# Patient Record
Sex: FEMALE | Race: OTHER | Employment: FULL TIME | URBAN - METROPOLITAN AREA
[De-identification: names, ages, dates, MRNs, and addresses within clinical notes are randomized per-mention and may not be internally consistent; named-entity substitution may affect disease eponyms.]

---

## 2017-03-17 ENCOUNTER — PATIENT MESSAGE (OUTPATIENT)
Dept: INTERNAL MEDICINE CLINIC | Facility: CLINIC | Age: 37
End: 2017-03-17

## 2017-03-18 ENCOUNTER — OFFICE VISIT (OUTPATIENT)
Dept: FAMILY MEDICINE CLINIC | Facility: CLINIC | Age: 37
End: 2017-03-18

## 2017-03-18 ENCOUNTER — TELEPHONE (OUTPATIENT)
Dept: FAMILY MEDICINE CLINIC | Facility: CLINIC | Age: 37
End: 2017-03-18

## 2017-03-18 VITALS
BODY MASS INDEX: 32.79 KG/M2 | TEMPERATURE: 98 F | HEART RATE: 78 BPM | RESPIRATION RATE: 14 BRPM | SYSTOLIC BLOOD PRESSURE: 100 MMHG | HEIGHT: 60 IN | WEIGHT: 167 LBS | DIASTOLIC BLOOD PRESSURE: 70 MMHG

## 2017-03-18 DIAGNOSIS — J01.00 ACUTE MAXILLARY SINUSITIS, RECURRENCE NOT SPECIFIED: Primary | ICD-10-CM

## 2017-03-18 PROCEDURE — 99213 OFFICE O/P EST LOW 20 MIN: CPT | Performed by: NURSE PRACTITIONER

## 2017-03-18 RX ORDER — AMOXICILLIN AND CLAVULANATE POTASSIUM 875; 125 MG/1; MG/1
1 TABLET, FILM COATED ORAL 2 TIMES DAILY
Qty: 14 TABLET | Refills: 0 | Status: SHIPPED | OUTPATIENT
Start: 2017-03-18 | End: 2017-03-25

## 2017-03-18 RX ORDER — PREDNISONE 20 MG/1
20 TABLET ORAL 2 TIMES DAILY
Qty: 6 TABLET | Refills: 0 | Status: SHIPPED | OUTPATIENT
Start: 2017-03-18 | End: 2017-03-21

## 2017-03-18 NOTE — PROGRESS NOTES
CHIEF COMPLAINT:   Patient presents with:  Sinusitis      HPI:   Sony Peres is a 31year old female who presents with URI symptoms for 5 days. Onset was gradual  Symptoms are progressing. Location is reported as mid face.   Description is reporte NOSE: Patient reports nasal congestion and pressure. THROAT: Patient denies sore throat and difficulty swallowing. LUNGS: Patient denies shortness of breath or wheezing. Patient reports mild cough.  Description by patient is consistent with post nasal dri Gary Morris is a 31year old female who presents with Sinusitis.  Symptoms are consistent with: Acute maxillary sinusitis, recurrence not specified  (primary encounter diagnosis)      ASSESSMENT:  Acute maxillary sinusitis, recurrence not specified · Drink plenty of water, hot tea, and other liquids. This may help thin mucus. It also may promote sinus drainage. · Heat may help soothe painful areas of the face. Use a towel soaked in hot water.  Or,  the shower and direct the hot spray onto you · Unusual drowsiness or confusion  · Swelling of the forehead or eyelids  · Vision problems, including blurred or double vision  · Fever of 100.4ºF (38ºC) or higher, or as directed by your healthcare provider  · Seizure  · Breathing problems  · Symptoms no Patient verbalized understanding of diagnosis, treatments, and instructions. All questions answered. No impediment to understanding.

## 2017-03-18 NOTE — TELEPHONE ENCOUNTER
Actions Requested: Pt sent to urgent care  Situation/Background   Problem: possible sinus infection   Onset: 5 days   Associated Symptoms: pressure around sinuses,headache,blowing out clear drainage and dry cough . No fever.  Taking Advil cold and sinus wit

## 2017-03-21 NOTE — TELEPHONE ENCOUNTER
From: Raya Mirza  To: Harsha Eden MD  Sent: 3/17/2017 5:16 PM CDT  Subject: Other    I left a message before 7am today to try and be seen for my illness and still have not heard back.      Raya Mirza  [phone]

## 2017-03-21 NOTE — TELEPHONE ENCOUNTER
See 3/18/17 acute telephone encounter. Patient was seen and treated at [facility] on 3/18/17.

## 2017-03-21 NOTE — TELEPHONE ENCOUNTER
See 3/18/17 acute telephone encounter. Patient was seen and treated at [facility] on 3/18/17.

## 2017-03-21 NOTE — TELEPHONE ENCOUNTER
From: Ann Marie Moulton  To: Juana Austin MD  Sent: 3/17/2017 6:16 AM CDT  Subject: Non-Urgent Medical Question    Good morning,  I'm not feeling well and would like to know if I can be seen this afternoon or weekend?  I started with just congestion

## 2017-04-18 ENCOUNTER — OFFICE VISIT (OUTPATIENT)
Dept: OBGYN CLINIC | Facility: CLINIC | Age: 37
End: 2017-04-18

## 2017-04-18 VITALS
SYSTOLIC BLOOD PRESSURE: 126 MMHG | BODY MASS INDEX: 33 KG/M2 | WEIGHT: 167.81 LBS | DIASTOLIC BLOOD PRESSURE: 88 MMHG | HEART RATE: 92 BPM

## 2017-04-18 DIAGNOSIS — N89.8 VAGINAL DISCHARGE: Primary | ICD-10-CM

## 2017-04-18 PROCEDURE — 99213 OFFICE O/P EST LOW 20 MIN: CPT | Performed by: ADVANCED PRACTICE MIDWIFE

## 2017-04-18 RX ORDER — IBUPROFEN 200 MG
200 TABLET ORAL EVERY 6 HOURS PRN
COMMUNITY
End: 2017-05-09 | Stop reason: ALTCHOICE

## 2017-04-18 NOTE — PROGRESS NOTES
S.  Has an irritation in vulvar area with odorless discharge and a lot of itching for 7 days. Recently was treated with an antibiotic.   Has not treated sx in any way  O.  Vulva - normal female w/o lesion, erythema or edema  Vagina - watery white d/c w/o o

## 2017-04-21 DIAGNOSIS — B96.89 BV (BACTERIAL VAGINOSIS): Primary | ICD-10-CM

## 2017-04-21 DIAGNOSIS — N76.0 BV (BACTERIAL VAGINOSIS): Primary | ICD-10-CM

## 2017-04-21 RX ORDER — METRONIDAZOLE 500 MG/1
500 TABLET ORAL 2 TIMES DAILY
Qty: 14 TABLET | Refills: 0 | Status: SHIPPED | OUTPATIENT
Start: 2017-04-21 | End: 2017-05-09 | Stop reason: ALTCHOICE

## 2017-05-09 ENCOUNTER — OFFICE VISIT (OUTPATIENT)
Dept: FAMILY MEDICINE CLINIC | Facility: CLINIC | Age: 37
End: 2017-05-09

## 2017-05-09 VITALS
TEMPERATURE: 98 F | WEIGHT: 170 LBS | HEART RATE: 80 BPM | SYSTOLIC BLOOD PRESSURE: 128 MMHG | RESPIRATION RATE: 18 BRPM | OXYGEN SATURATION: 98 % | BODY MASS INDEX: 33 KG/M2 | DIASTOLIC BLOOD PRESSURE: 78 MMHG

## 2017-05-09 DIAGNOSIS — B37.0 ORAL CANDIDIASIS: Primary | ICD-10-CM

## 2017-05-09 PROCEDURE — 99213 OFFICE O/P EST LOW 20 MIN: CPT | Performed by: NURSE PRACTITIONER

## 2017-05-10 NOTE — PROGRESS NOTES
CHIEF COMPLAINT:     Patient presents with:   Thrush: think she may have oral thrush      HPI:   Florin Dela Cruz is a 31year old female who presents with complaints of 'bumps on back of tongue\", was on abx for sinus infection recently and also had vagi with 5 ml 4 times per day for 14 days  The patient indicates understanding of these issues and agrees to the plan. The patient has appointment with PCP in 10 days, would like to trry this treatment until she is seen. Crescencio Brand

## 2017-05-10 NOTE — PATIENT INSTRUCTIONS
Candida Infection: Yaquelin Roldan is a type of fungal infection in the mouth and throat. Sourav Martínez does not usually affect healthy adults. It is more common in people with a weakened immune system. It is also more likely if you take antibiotics.  Sourav Martínez is n Your health care provider will ask about your medical history and your symptoms. He or she will look closely at your mouth and throat. White or red patches will be scraped with a tongue depressor.  The sample will be sent to a lab to test. This test can usu You may be able to help prevent some cases of thrush. Make sure to:  · Practice good oral hygiene. Try using a chlorhexidine mouthwash. · Clean your dentures regularly as instructed. Make sure they fit you correctly.   · After using a corticosteroid inhale

## 2018-03-02 ENCOUNTER — PATIENT MESSAGE (OUTPATIENT)
Dept: INTERNAL MEDICINE CLINIC | Facility: CLINIC | Age: 38
End: 2018-03-02

## 2018-03-02 NOTE — TELEPHONE ENCOUNTER
Pt requesting to  lab titers from 2014 from [place] office. Please have clinic staff print and place in envelope.   Call pt when ready for P/U

## 2018-03-02 NOTE — TELEPHONE ENCOUNTER
From: Noel Mason  To: Barbara Mejia MD  Sent: 3/2/2018 11:11 AM CST  Subject: Test Results Question    Last week I requested a copy of the titers I had done in 2014 and still have not heard back from the office.  I am in the process of enrolli

## 2018-03-07 NOTE — TELEPHONE ENCOUNTER
Please call patient to  lab titers--message sent to patient via [employer] that [facility] staff will call her.

## 2018-03-07 NOTE — TELEPHONE ENCOUNTER
Approved   - To  Copy and  Mail  It to pt  Labs  For 2014   Or  She can  pick it  Up   Labs  From   2014 -  That  Was  4  Years  Ago   -  I Do not  Believe that is good enough  For school  Since  was  Almost  4 years ago , but its ok to provide with   Lab

## 2018-03-08 NOTE — TELEPHONE ENCOUNTER
Called Pt, states she has not received a call from [place] office yet. I informed her, will send request to print them. Offered to mail titers. Pt states she would rather pick them up.      [place] clinical staff, please print titers for Pt and call when re

## 2018-03-09 ENCOUNTER — OFFICE VISIT (OUTPATIENT)
Dept: FAMILY MEDICINE CLINIC | Facility: CLINIC | Age: 38
End: 2018-03-09

## 2018-03-09 DIAGNOSIS — Z02.9 ENCOUNTERS FOR ADMINISTRATIVE PURPOSES: Primary | ICD-10-CM

## 2018-03-09 NOTE — PROGRESS NOTES
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  Patient also with titers from 4 years ago and would like her titer information filled out clearing her.     Upon further examination of school form, practitioner clearing patient to carry 30 lbs, c

## 2018-03-15 ENCOUNTER — APPOINTMENT (OUTPATIENT)
Dept: LAB | Facility: HOSPITAL | Age: 38
End: 2018-03-15
Attending: INTERNAL MEDICINE
Payer: COMMERCIAL

## 2018-03-15 ENCOUNTER — PATIENT MESSAGE (OUTPATIENT)
Dept: INTERNAL MEDICINE CLINIC | Facility: CLINIC | Age: 38
End: 2018-03-15

## 2018-03-15 ENCOUNTER — OFFICE VISIT (OUTPATIENT)
Dept: INTERNAL MEDICINE CLINIC | Facility: CLINIC | Age: 38
End: 2018-03-15

## 2018-03-15 VITALS
SYSTOLIC BLOOD PRESSURE: 105 MMHG | HEART RATE: 87 BPM | DIASTOLIC BLOOD PRESSURE: 69 MMHG | HEIGHT: 60 IN | BODY MASS INDEX: 32.98 KG/M2 | WEIGHT: 168 LBS | TEMPERATURE: 98 F

## 2018-03-15 DIAGNOSIS — Z00.00 ANNUAL PHYSICAL EXAM: Primary | ICD-10-CM

## 2018-03-15 DIAGNOSIS — Z00.8 ENCOUNTER FOR BIOMETRIC SCREENING: ICD-10-CM

## 2018-03-15 DIAGNOSIS — Z00.00 ANNUAL PHYSICAL EXAM: ICD-10-CM

## 2018-03-15 LAB — RUBV IGG SER-ACNC: 13.6 IU/ML

## 2018-03-15 PROCEDURE — 86480 TB TEST CELL IMMUN MEASURE: CPT

## 2018-03-15 PROCEDURE — 99395 PREV VISIT EST AGE 18-39: CPT | Performed by: INTERNAL MEDICINE

## 2018-03-15 PROCEDURE — 86787 VARICELLA-ZOSTER ANTIBODY: CPT

## 2018-03-15 PROCEDURE — 86706 HEP B SURFACE ANTIBODY: CPT

## 2018-03-15 PROCEDURE — 36415 COLL VENOUS BLD VENIPUNCTURE: CPT

## 2018-03-15 PROCEDURE — 86765 RUBEOLA ANTIBODY: CPT

## 2018-03-15 PROCEDURE — 86735 MUMPS ANTIBODY: CPT

## 2018-03-15 PROCEDURE — 86762 RUBELLA ANTIBODY: CPT

## 2018-03-15 NOTE — PROGRESS NOTES
Zayda Morel is a 30year old female. 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   She needs th tremors. ALLERGY/ASTHMA: No seasonal allergy or asthma. HEME: no anemia, no abnormal bleeding or easy bruising. PSYCH: Denies anxiety or feeling depressed.         EXAM:   /69 (BP Location: Left arm, Patient Position: Sitting, Cuff Size: adult)   P Future  -     VARICELLA ZOSTER, IGG; Future  -     QUANTIFERON TB; Future                The patient indicates understanding of these issues and agrees to the plan.               Samir Cavanaugh MD  3/15/2018  3:20 PM

## 2018-03-15 NOTE — TELEPHONE ENCOUNTER
From: Agatha Leung  To: Princess Seda MD  Sent: 3/15/2018 4:02 PM CDT  Subject: Other    [facility]    I received an email before I checked out from your office a few minutes ago from the nursing [facility].  They will not accept the titers I showe

## 2018-03-16 ENCOUNTER — OFFICE VISIT (OUTPATIENT)
Dept: NEUROLOGY | Facility: CLINIC | Age: 38
End: 2018-03-16

## 2018-03-16 VITALS
SYSTOLIC BLOOD PRESSURE: 88 MMHG | BODY MASS INDEX: 32.98 KG/M2 | HEART RATE: 71 BPM | RESPIRATION RATE: 16 BRPM | DIASTOLIC BLOOD PRESSURE: 70 MMHG | WEIGHT: 168 LBS | HEIGHT: 60 IN

## 2018-03-16 DIAGNOSIS — G43.009 MIGRAINE WITHOUT AURA AND WITHOUT STATUS MIGRAINOSUS, NOT INTRACTABLE: Primary | ICD-10-CM

## 2018-03-16 DIAGNOSIS — IMO0002 CHRONIC MIGRAINE: ICD-10-CM

## 2018-03-16 LAB
HBV SURFACE AB SER-ACNC: 22.51 MIU/ML (ref ?–10)
HBV SURFACE AG SERPL QL IA: REACTIVE
MEV IGG SER-ACNC: 117 AU/ML (ref 30–?)
MUV IGG SER IA-ACNC: 64.6 AU/ML (ref 11–?)
VZV IGG SER IA-ACNC: 335.4 (ref 165–?)

## 2018-03-16 PROCEDURE — 99204 OFFICE O/P NEW MOD 45 MIN: CPT | Performed by: OTHER

## 2018-03-16 RX ORDER — ONDANSETRON 4 MG/1
4 TABLET, ORALLY DISINTEGRATING ORAL EVERY 8 HOURS PRN
Qty: 30 TABLET | Refills: 1 | Status: SHIPPED | OUTPATIENT
Start: 2018-03-16 | End: 2020-07-10

## 2018-03-16 RX ORDER — TOPIRAMATE 50 MG/1
TABLET, FILM COATED ORAL
Qty: 60 TABLET | Refills: 1 | Status: SHIPPED | OUTPATIENT
Start: 2018-03-16 | End: 2018-11-28 | Stop reason: ALTCHOICE

## 2018-03-16 RX ORDER — RIZATRIPTAN BENZOATE 10 MG/1
10 TABLET, ORALLY DISINTEGRATING ORAL AS NEEDED
Qty: 12 TABLET | Refills: 2 | Status: SHIPPED | OUTPATIENT
Start: 2018-03-16 | End: 2018-03-28

## 2018-03-16 NOTE — PATIENT INSTRUCTIONS
Migraine headache  Introduction  Migraines are extremely painful, recurring headaches that are sometimes accompanied by other symptoms, such as visual disturbances, for example, seeing an aura or nausea.  There are 2 types of migraine:  Migraine with aura, vessels narrow or constrict, reducing blood flow and leading to visual disturbances, difficulty speaking, weakness, numbness, or tingling sensation in one area of the body, or other similar symptoms.  Later, the blood vessels dilate or enlarge, leading to i whether you have a migraine or another kind of headache, such as a tension or sinus headache.  Your doctor will ask questions about when your headaches occur, how long they last, how often they come on, the location of the pain, and any symptoms that accomp and time it started. Note what you ate for the preceding 24 hours, how long you slept the night before, what you were doing just before the headache, any unusual stress in your life, how long the headache lasted, and what you did to make it stop.   Other li drugs help prevent migraines, although researchers are not sure why:   Divalproex sodium (Depakote)   Gabapentin (Neurontin)   Topiramate (Topamax)   Botox.  Botox, a medication made from a purified form of botulinum toxin, has been approved to treat migrai Cheese   Monosodium glutamate (MSG), a flavor enhancer found often in food from [employer] containing the amino acid tyramine, found in red wine, aged cheese, smoked fish, chicken livers, figs, and some beans   Nuts   Peanut butter   Madhu people with low levels of magnesium. In one study, people who took magnesium reduce the frequency of attacks by 41.6%, compared to 15.8% in those who took placebo.  Some studies also suggest that magnesium may help women whose migraines are triggered by the months. More research is needed to see whether butterbur is effective at preventing migraines. The studies used a standardized extract that lowered the amount of substances in the herb that might potentially harm the liver.  If you want to try butterbur for sinensis). Ask your doctor before taking Roise Breach, as it may interact with some medications or cause problems for people with some cancers. Alexia (Zingiber officinale)   Ginkgo biloba   [place] bark(Salix spp.).  People who are sensitive to aspirin shoul are believed to correspond to areas throughout the body. Preliminary studies suggest it may relieve pain and allow people with migraines to take less pain medication. More research is needed.  Practitioners believe reflexology helps you become more aware of the head and may be relieved following urination; this remedy is most appropriate for individuals who feel extremely weak and have difficulty keeping their eyes open. Ignatia.  For pain that may be described as a feeling of something being driven into the pattern (such as every seven days), and are accompanied by nausea and vomiting; pain is aggravated by motion, light or sun exposure, odors, and noise; this remedy is appropriate for children who may have a craving for spicy or acidic foods, despite having over-the-counter or prescription, or any complementary therapy before or during your pregnancy. Some doctors may recommend treating mild-to-moderate attacks during pregnancy with acetaminophen (Tylenol).   Warnings and Precautions  Use medications only as d

## 2018-03-19 LAB
M TB IFN-G CD4+ BCKGRND COR BLD-ACNC: -0.01 IU/ML
M TB IFN-G CD4+ T-CELLS BLD-ACNC: 0.05 IU/ML
M TB TUBERC IFN-G BLD QL: NEGATIVE
M TB TUBERC IGNF/MITOGEN IGNF CONTROL: >10 IU/ML

## 2018-03-20 ENCOUNTER — PATIENT MESSAGE (OUTPATIENT)
Dept: NEUROLOGY | Facility: CLINIC | Age: 38
End: 2018-03-20

## 2018-03-20 ENCOUNTER — TELEPHONE (OUTPATIENT)
Dept: INTERNAL MEDICINE CLINIC | Facility: CLINIC | Age: 38
End: 2018-03-20

## 2018-03-20 DIAGNOSIS — Z02.1 PRE-EMPLOYMENT EXAMINATION: Primary | ICD-10-CM

## 2018-03-20 NOTE — TELEPHONE ENCOUNTER
Results of the titers discussed with the patient. Her rubella titers are low. Please give an MMR vaccine. She will make a nurse visit. Please give a copy of her blood work. Repeat titer in 1 month.   I have the copy of her original documents

## 2018-03-20 NOTE — TELEPHONE ENCOUNTER
From: Gary Morris  To:  Ayla Chavarria MD  Sent: 3/20/2018 9:29 AM CDT  Subject: Liberty Gregg,  I have been taking the Topamax since Friday night and although I've noticed a huge improvement in the number of migraines an

## 2018-03-22 ENCOUNTER — PATIENT MESSAGE (OUTPATIENT)
Dept: NEUROLOGY | Facility: CLINIC | Age: 38
End: 2018-03-22

## 2018-03-22 NOTE — PROGRESS NOTES
Neurology Outpatient Initial Note    Nagi Pitt : 1980   HPI:     Nagi Pitt is a 30year old female who is being seen in neurologic evaluation. Patient is being seen in eval for migraines.     She has had migraines since childho cerebrovascular accident   • Other [OTHER] Father      rosacea    • Diabetes Mother    • Other [OTHER] Son 6     testicle surgery on left side       Social History:  Social History    Marital status:              Spouse name: drift  Sensory: intact to light touch and pinprick throughout  Reflexes: DTRs 2+ in bilateral biceps, brachioradialis, patella  Coordination / gait: no finger-nose-finger dysmetria, normal gait    IMAGING / STUDIES:  None available  LABS: reviewed     ASSE

## 2018-03-22 NOTE — TELEPHONE ENCOUNTER
From: Messi Jackson  To: Jesse Wang MD  Sent: 3/22/2018 12:39 PM CDT  Subject: Prescription Question    Hello Dr. Alexsander Lyons,    Call me when you have some time so we can discuss the second option you've provided in the previous message.  I was unable

## 2018-03-24 ENCOUNTER — LAB ENCOUNTER (OUTPATIENT)
Dept: LAB | Facility: HOSPITAL | Age: 38
End: 2018-03-24
Attending: INTERNAL MEDICINE
Payer: COMMERCIAL

## 2018-03-24 DIAGNOSIS — Z00.00 ANNUAL PHYSICAL EXAM: ICD-10-CM

## 2018-03-24 DIAGNOSIS — Z02.1 PRE-EMPLOYMENT EXAMINATION: ICD-10-CM

## 2018-03-24 LAB
ALBUMIN SERPL BCP-MCNC: 4.1 G/DL (ref 3.5–4.8)
ALBUMIN/GLOB SERPL: 1.5 {RATIO} (ref 1–2)
ALP SERPL-CCNC: 60 U/L (ref 32–100)
ALT SERPL-CCNC: 17 U/L (ref 14–54)
ANION GAP SERPL CALC-SCNC: 4 MMOL/L (ref 0–18)
AST SERPL-CCNC: 17 U/L (ref 15–41)
BASOPHILS # BLD: 0 K/UL (ref 0–0.2)
BASOPHILS NFR BLD: 0 %
BILIRUB SERPL-MCNC: 0.7 MG/DL (ref 0.3–1.2)
BUN SERPL-MCNC: 7 MG/DL (ref 8–20)
BUN/CREAT SERPL: 9.9 (ref 10–20)
CALCIUM SERPL-MCNC: 8.9 MG/DL (ref 8.5–10.5)
CHLORIDE SERPL-SCNC: 109 MMOL/L (ref 95–110)
CHOLEST SERPL-MCNC: 169 MG/DL (ref 110–200)
CO2 SERPL-SCNC: 24 MMOL/L (ref 22–32)
CREAT SERPL-MCNC: 0.71 MG/DL (ref 0.5–1.5)
EOSINOPHIL # BLD: 0.1 K/UL (ref 0–0.7)
EOSINOPHIL NFR BLD: 1 %
ERYTHROCYTE [DISTWIDTH] IN BLOOD BY AUTOMATED COUNT: 13.3 % (ref 11–15)
GLOBULIN PLAS-MCNC: 2.8 G/DL (ref 2.5–3.7)
GLUCOSE SERPL-MCNC: 107 MG/DL (ref 70–99)
HCT VFR BLD AUTO: 41.6 % (ref 35–48)
HDLC SERPL-MCNC: 58 MG/DL
HGB BLD-MCNC: 14.1 G/DL (ref 12–16)
LDLC SERPL CALC-MCNC: 98 MG/DL (ref 0–99)
LYMPHOCYTES # BLD: 1.7 K/UL (ref 1–4)
LYMPHOCYTES NFR BLD: 24 %
MCH RBC QN AUTO: 29.2 PG (ref 27–32)
MCHC RBC AUTO-ENTMCNC: 33.9 G/DL (ref 32–37)
MCV RBC AUTO: 86.2 FL (ref 80–100)
MONOCYTES # BLD: 0.5 K/UL (ref 0–1)
MONOCYTES NFR BLD: 7 %
NEUTROPHILS # BLD AUTO: 5 K/UL (ref 1.8–7.7)
NEUTROPHILS NFR BLD: 68 %
NONHDLC SERPL-MCNC: 111 MG/DL
OSMOLALITY UR CALC.SUM OF ELEC: 282 MOSM/KG (ref 275–295)
PATIENT FASTING: YES
PLATELET # BLD AUTO: 279 K/UL (ref 140–400)
PMV BLD AUTO: 8.3 FL (ref 7.4–10.3)
POTASSIUM SERPL-SCNC: 4.6 MMOL/L (ref 3.3–5.1)
PROT SERPL-MCNC: 6.9 G/DL (ref 5.9–8.4)
RBC # BLD AUTO: 4.83 M/UL (ref 3.7–5.4)
RUBV IGG SER-ACNC: 14.4 IU/ML
SODIUM SERPL-SCNC: 137 MMOL/L (ref 136–144)
TRIGL SERPL-MCNC: 64 MG/DL (ref 1–149)
TSH SERPL-ACNC: 1.23 UIU/ML (ref 0.45–5.33)
WBC # BLD AUTO: 7.3 K/UL (ref 4–11)

## 2018-03-24 PROCEDURE — 36415 COLL VENOUS BLD VENIPUNCTURE: CPT

## 2018-03-24 PROCEDURE — 86762 RUBELLA ANTIBODY: CPT

## 2018-03-24 PROCEDURE — 80053 COMPREHEN METABOLIC PANEL: CPT

## 2018-03-24 PROCEDURE — 83036 HEMOGLOBIN GLYCOSYLATED A1C: CPT

## 2018-03-24 PROCEDURE — 80050 GENERAL HEALTH PANEL: CPT

## 2018-03-24 PROCEDURE — 85025 COMPLETE CBC W/AUTO DIFF WBC: CPT

## 2018-03-24 PROCEDURE — 80061 LIPID PANEL: CPT

## 2018-03-25 LAB — HBA1C MFR BLD: 5.8 % (ref 4–6)

## 2018-03-26 ENCOUNTER — NURSE ONLY (OUTPATIENT)
Dept: INTERNAL MEDICINE CLINIC | Facility: CLINIC | Age: 38
End: 2018-03-26

## 2018-03-26 ENCOUNTER — TELEPHONE (OUTPATIENT)
Dept: NEUROLOGY | Facility: CLINIC | Age: 38
End: 2018-03-26

## 2018-03-26 ENCOUNTER — PATIENT MESSAGE (OUTPATIENT)
Dept: INTERNAL MEDICINE CLINIC | Facility: CLINIC | Age: 38
End: 2018-03-26

## 2018-03-26 DIAGNOSIS — Z23 IMMUNIZATION DUE: Primary | ICD-10-CM

## 2018-03-26 PROCEDURE — 90471 IMMUNIZATION ADMIN: CPT | Performed by: INTERNAL MEDICINE

## 2018-03-26 PROCEDURE — 90707 MMR VACCINE SC: CPT | Performed by: INTERNAL MEDICINE

## 2018-03-26 NOTE — TELEPHONE ENCOUNTER
[facility] Medication request: Rizatriptan 10 mg Take 1 tablet at the onset of a headache. Qt 12 Refills 0    LOV: 3/16/18  NOV: None    Last refill:3/16/18 Sent to [facility] in [employer].

## 2018-03-26 NOTE — PROGRESS NOTES
Pls see telephone encounter 3/20/18. Per Dr. Noam Rivas, patient needs a MMR booster. Pt arrived today for a nurse visit for MMR booster. Pt verified name and .   Had patient wait for 10 min after giving injection, and she stts she had no side effects or r

## 2018-03-27 NOTE — TELEPHONE ENCOUNTER
Addressed already. Please see below. Pls see telephone encounter 3/20/18. Per Dr. Cielo Gerard, patient needs a MMR booster. Pt arrived today for a nurse visit for MMR booster. Pt verified name and .   Had patient wait for 10 min after giving injection,

## 2018-03-27 NOTE — TELEPHONE ENCOUNTER
From: Yordan Gonzáles  To: Heather Kunz MD  Sent: 3/26/2018 12:52 PM CDT  Subject: Test Results Question    I have a question about Maykel Lee resulted on 3/24/18, 12:32 Jeremy Gerard,  I am scheduled for the Rubella vaccine this afternoon a

## 2018-03-28 RX ORDER — RIZATRIPTAN BENZOATE 10 MG/1
10 TABLET, ORALLY DISINTEGRATING ORAL AS NEEDED
Qty: 36 TABLET | Refills: 2 | Status: SHIPPED | OUTPATIENT
Start: 2018-03-28 | End: 2019-06-03

## 2018-04-05 RX ORDER — NORTRIPTYLINE HYDROCHLORIDE 10 MG/1
10 CAPSULE ORAL NIGHTLY
Qty: 30 CAPSULE | Refills: 1 | Status: SHIPPED | OUTPATIENT
Start: 2018-04-05 | End: 2018-06-20

## 2018-05-03 ENCOUNTER — APPOINTMENT (OUTPATIENT)
Dept: LAB | Facility: HOSPITAL | Age: 38
End: 2018-05-03
Attending: INTERNAL MEDICINE
Payer: COMMERCIAL

## 2018-05-03 DIAGNOSIS — Z02.1 PRE-EMPLOYMENT HEALTH SCREENING EXAMINATION: ICD-10-CM

## 2018-05-03 PROCEDURE — 36415 COLL VENOUS BLD VENIPUNCTURE: CPT

## 2018-05-03 PROCEDURE — 86762 RUBELLA ANTIBODY: CPT

## 2018-05-05 ENCOUNTER — PATIENT MESSAGE (OUTPATIENT)
Dept: INTERNAL MEDICINE CLINIC | Facility: CLINIC | Age: 38
End: 2018-05-05

## 2018-05-05 NOTE — TELEPHONE ENCOUNTER
From: Maikel Robin  To: Lorenzo Roy MD  Sent: 5/5/2018 9:28 AM CDT  Subject: Test Results Neftaly Mack,     I have a question about RUBELLA, IGG resulted on 5/3/18, 7:01 PM.    Can I obtain a copy of this result at the , or

## 2018-05-17 NOTE — TELEPHONE ENCOUNTER
Please see patient E-mail. Contact patient when ready to be picked up.   Please respond to pool: EM MAHI [facility] LPN/MERCEDES

## 2018-05-31 ENCOUNTER — TELEPHONE (OUTPATIENT)
Dept: OTHER | Age: 38
End: 2018-05-31

## 2018-05-31 ENCOUNTER — PATIENT MESSAGE (OUTPATIENT)
Dept: NEUROLOGY | Facility: CLINIC | Age: 38
End: 2018-05-31

## 2018-05-31 NOTE — TELEPHONE ENCOUNTER
Contacted pt and informed her that letter has been generated, and ready for  at the [facility] [employer] .   Pt verbalized understanding

## 2018-05-31 NOTE — TELEPHONE ENCOUNTER
Brooklyn Sayer   to Terri Betancourt MD           5/31/18 11:56 AM   I have a question about Jocelyne Fitzgerald resulted on 5/3/18, 7:01 PM.     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

## 2018-05-31 NOTE — TELEPHONE ENCOUNTER
From: Yessica Graham  To: Seamus Cordero MD  Sent: 5/31/2018 11:53 AM CDT  Subject: Cortney Renner Standing,    I wanted to give you an update on my migraines.  I've been taking Nortriptyline 10 MG for a month now and haven't seen muc

## 2018-06-19 ENCOUNTER — PATIENT MESSAGE (OUTPATIENT)
Dept: NEUROLOGY | Facility: CLINIC | Age: 38
End: 2018-06-19

## 2018-06-19 NOTE — TELEPHONE ENCOUNTER
From: Manolo Conroy  To: Estrella Jose MD  Sent: 6/19/2018 10:03 AM CDT  Subject: Prescription Question    Good morning Dr. Yue Cheatham,    I wanted to give you an update on my migraines.  I've been taking 30 mg of Nortriptyline nightly for the past 1.5 w

## 2018-06-20 ENCOUNTER — PATIENT MESSAGE (OUTPATIENT)
Dept: NEUROLOGY | Facility: CLINIC | Age: 38
End: 2018-06-20

## 2018-06-20 NOTE — TELEPHONE ENCOUNTER
From: Jairo Joshi  To: Belen Subarmanian MD  Sent: 6/20/2018 4:33 PM CDT  Subject: Prescription Question    Dr. Chetna Escalante,  Thank you for responding back.  I think for now I would like to continue the Nortriptyline 30mg nightly, but will need a new prescr

## 2018-06-21 ENCOUNTER — PATIENT MESSAGE (OUTPATIENT)
Dept: NEUROLOGY | Facility: CLINIC | Age: 38
End: 2018-06-21

## 2018-06-21 ENCOUNTER — TELEPHONE (OUTPATIENT)
Dept: NEUROLOGY | Facility: CLINIC | Age: 38
End: 2018-06-21

## 2018-06-21 DIAGNOSIS — G43.009 MIGRAINE WITHOUT AURA AND WITHOUT STATUS MIGRAINOSUS, NOT INTRACTABLE: Primary | ICD-10-CM

## 2018-06-21 RX ORDER — NORTRIPTYLINE HYDROCHLORIDE 10 MG/1
10 CAPSULE ORAL NIGHTLY
Qty: 30 CAPSULE | Refills: 1 | Status: SHIPPED | OUTPATIENT
Start: 2018-06-21 | End: 2018-06-25

## 2018-06-21 NOTE — TELEPHONE ENCOUNTER
From: Maikel Robin  To: Zahraa Clemons MD  Sent: 6/21/2018 1:11 AM CDT  Subject: Referral Request    Hi Dr. Raman Alvarez,    I would like to get a brain MRI to make sure everything is ok up there.  I can then set up an appt to discuss the results and come

## 2018-06-21 NOTE — TELEPHONE ENCOUNTER
[employer] online for authorization coverage of MRI Brain. Approved with Authorization # [id number] Effective dates 6/21/2018 - 7/20/2018. Will call Pt to inform.

## 2018-06-22 ENCOUNTER — PATIENT MESSAGE (OUTPATIENT)
Dept: NEUROLOGY | Facility: CLINIC | Age: 38
End: 2018-06-22

## 2018-06-25 NOTE — TELEPHONE ENCOUNTER
Refill request for nortriptyline 10 mg, take 3 tabs nightly, #90, 1 refill    LOV: 3/16/18  NOV: none

## 2018-06-25 NOTE — TELEPHONE ENCOUNTER
From: Zayda Morel  To: Natividad Christine MD  Sent: 6/22/2018 6:33 PM CDT  Subject: Prescription Question    Good evening Dr. Antonio Peralta,    My pharmacy has not received the prescription for the Nortriptyline.  I'm currently taking 30mg(3-10mg tabs) daily,

## 2018-06-26 RX ORDER — NORTRIPTYLINE HYDROCHLORIDE 10 MG/1
30 CAPSULE ORAL NIGHTLY
Qty: 90 CAPSULE | Refills: 1 | Status: SHIPPED | OUTPATIENT
Start: 2018-06-26 | End: 2018-08-27

## 2018-06-28 ENCOUNTER — PATIENT MESSAGE (OUTPATIENT)
Dept: NEUROLOGY | Facility: CLINIC | Age: 38
End: 2018-06-28

## 2018-06-28 ENCOUNTER — HOSPITAL ENCOUNTER (OUTPATIENT)
Dept: MRI IMAGING | Age: 38
Discharge: HOME | End: 2018-06-28
Attending: Other
Payer: COMMERCIAL

## 2018-06-28 DIAGNOSIS — G43.009 MIGRAINE WITHOUT AURA AND WITHOUT STATUS MIGRAINOSUS, NOT INTRACTABLE: ICD-10-CM

## 2018-06-28 PROCEDURE — 70551 MRI BRAIN STEM W/O DYE: CPT | Performed by: OTHER

## 2018-06-28 NOTE — TELEPHONE ENCOUNTER
From: Jonathan Wallace  To:  Pamela Vergara MD  Sent: 6/28/2018 11:46 AM CDT  Subject: Test Results Question    Good morning Dr. Aria Brewer,    I just wanted to let you know that I had my brain MRI this morning at 8am. The technician said you should have acc

## 2018-07-19 ENCOUNTER — TELEPHONE (OUTPATIENT)
Dept: NEUROLOGY | Facility: CLINIC | Age: 38
End: 2018-07-19

## 2018-07-24 ENCOUNTER — PATIENT MESSAGE (OUTPATIENT)
Dept: NEUROLOGY | Facility: CLINIC | Age: 38
End: 2018-07-24

## 2018-07-24 NOTE — TELEPHONE ENCOUNTER
From: Zayda Morel  To: Natividad Christine MD  Sent: 7/24/2018 4:53 PM CDT  Subject: Prescription Question    [facility] Dr. Antonio Peralta,  I wanted to let you know that my migraines have somewhat improved with the 30mg of Nortriptyline.  I would like to know if th

## 2018-07-25 ENCOUNTER — PATIENT MESSAGE (OUTPATIENT)
Dept: NEUROLOGY | Facility: CLINIC | Age: 38
End: 2018-07-25

## 2018-07-25 NOTE — TELEPHONE ENCOUNTER
From: Candida Draper  To: Hal Ochoa MD  Sent: 7/25/2018 10:38 AM CDT  Subject: Prescription Question    Dr. Delvin Santizo,    How effective are Botox injections for the relief of migraines? How often do the effects last? Who would administer them?  Where

## 2018-07-28 ENCOUNTER — PATIENT MESSAGE (OUTPATIENT)
Dept: NEUROLOGY | Facility: CLINIC | Age: 38
End: 2018-07-28

## 2018-07-30 NOTE — TELEPHONE ENCOUNTER
From: Candida Draper  To: Hal Ochoa MD  Sent: 7/28/2018 12:49 PM CDT  Subject: Non-Urgent Norma Santizo,    Just thought I'd check in to see you received the two messages I sent during the week?    I hope all is well and hop

## 2018-08-03 NOTE — PROGRESS NOTES
Botox Authorization/Reauthorization Questionnaire:      Year of initial migraine onset? Childhood  Does patient have more than 15 headache days a month? Yes   Do headaches last 4 hours or more per day?   Yes  Have headaches persisted with this frequency

## 2018-08-06 ENCOUNTER — TELEPHONE (OUTPATIENT)
Dept: NEUROLOGY | Facility: CLINIC | Age: 38
End: 2018-08-06

## 2018-08-06 NOTE — TELEPHONE ENCOUNTER
Faxed Predetermination form, clinical notes to [facility] for authorization for Botox 200 u/vl cpt codes [id number], 62236 pending approval.

## 2018-08-27 ENCOUNTER — PATIENT MESSAGE (OUTPATIENT)
Dept: NEUROLOGY | Facility: CLINIC | Age: 38
End: 2018-08-27

## 2018-08-27 ENCOUNTER — MED REC SCAN ONLY (OUTPATIENT)
Dept: NEUROLOGY | Facility: CLINIC | Age: 38
End: 2018-08-27

## 2018-08-27 RX ORDER — NORTRIPTYLINE HYDROCHLORIDE 10 MG/1
30 CAPSULE ORAL NIGHTLY
Qty: 90 CAPSULE | Refills: 1 | Status: SHIPPED | OUTPATIENT
Start: 2018-08-27 | End: 2018-11-28 | Stop reason: ALTCHOICE

## 2018-08-27 NOTE — TELEPHONE ENCOUNTER
Received fax from [address] advising of denial for Botox 200 u/vl. Reason for denial is Pt had to have failed 3 oral preventative  Medications I.e., beta blockers, anticonvulsants and antidepressants.  Next step is appeal. Will inform

## 2018-08-27 NOTE — TELEPHONE ENCOUNTER
[facility] nortriptyline RX 90 request completed and placed on  desk for signature     signed form. Faxed to [employer].

## 2018-08-27 NOTE — TELEPHONE ENCOUNTER
From: Aubree Moreno MD  To: Annabel Estrada  Sent: 8/27/2018 4:06 PM CDT  Subject: Botox has been denied    Hi Jobinasecond,    Unfortunately, your Botox has been denied by [employer].  With your plan, we are required to have you try at least 3

## 2018-08-27 NOTE — TELEPHONE ENCOUNTER
Refill request for nortriptyline 10 mg, take 3 caps nightly, #90, 1 refill    LOV: 3/16/18  NOV: none

## 2018-08-30 NOTE — TELEPHONE ENCOUNTER
Received letter from [employer] advising Botox is not eligible for consideration under the Comprehensive Medical Benefit Plan. This procedure requires step therapy before the plan will consider an approval for these services. NOTE: PtMeg woodward

## 2018-09-18 ENCOUNTER — TELEPHONE (OUTPATIENT)
Dept: NEUROLOGY | Facility: CLINIC | Age: 38
End: 2018-09-18

## 2018-09-20 ENCOUNTER — MED REC SCAN ONLY (OUTPATIENT)
Dept: NEUROLOGY | Facility: CLINIC | Age: 38
End: 2018-09-20

## 2018-10-30 ENCOUNTER — PATIENT MESSAGE (OUTPATIENT)
Dept: NEUROLOGY | Facility: CLINIC | Age: 38
End: 2018-10-30

## 2018-10-31 NOTE — TELEPHONE ENCOUNTER
From: Severiano Slaughter  To:  Iker Mason MD  Sent: 10/30/2018 10:50 PM CDT  Subject: Rosa Gaitan Dr. Jair Parada haven't been in contact since August, I've had so much going on in my family and had to put my health on the terry

## 2018-11-12 ENCOUNTER — PATIENT MESSAGE (OUTPATIENT)
Dept: NEUROLOGY | Facility: CLINIC | Age: 38
End: 2018-11-12

## 2018-11-12 NOTE — TELEPHONE ENCOUNTER
From: Lin Cox  To: Jessica Molina MD  Sent: 11/12/2018 3:39 PM CST  Subject: Prescription Question    Hello Dr. Shane Lopez,    The [facility] pharmacy [facility] has not received the prescription for the Aimovig injection.  As soon as they r

## 2018-11-12 NOTE — TELEPHONE ENCOUNTER
From: Noel Mason  To:  Bryn Marcelo MD  Sent: 11/12/2018 7:26 AM CST  Subject: Prescription Question    Good morning Dr. Robert Ames,    I received a letter from the [address] [place] [address] with instructions on how to obtain a $5 copay prescription card that

## 2018-11-28 ENCOUNTER — OFFICE VISIT (OUTPATIENT)
Dept: INTERNAL MEDICINE CLINIC | Facility: CLINIC | Age: 38
End: 2018-11-28
Payer: COMMERCIAL

## 2018-11-28 VITALS
TEMPERATURE: 98 F | HEART RATE: 71 BPM | BODY MASS INDEX: 32.67 KG/M2 | WEIGHT: 166.38 LBS | HEIGHT: 60 IN | DIASTOLIC BLOOD PRESSURE: 78 MMHG | SYSTOLIC BLOOD PRESSURE: 129 MMHG

## 2018-11-28 DIAGNOSIS — K59.00 CONSTIPATION, UNSPECIFIED CONSTIPATION TYPE: Primary | ICD-10-CM

## 2018-11-28 PROCEDURE — 99212 OFFICE O/P EST SF 10 MIN: CPT | Performed by: INTERNAL MEDICINE

## 2018-11-28 PROCEDURE — 99213 OFFICE O/P EST LOW 20 MIN: CPT | Performed by: INTERNAL MEDICINE

## 2018-11-28 RX ORDER — DOCUSATE SODIUM 100 MG/1
100 CAPSULE, LIQUID FILLED ORAL 2 TIMES DAILY
Qty: 20 CAPSULE | Refills: 0 | Status: SHIPPED | OUTPATIENT
Start: 2018-11-28 | End: 2020-07-10

## 2018-11-28 RX ORDER — RIZATRIPTAN BENZOATE 10 MG/1
TABLET, ORALLY DISINTEGRATING ORAL
COMMUNITY
Start: 2018-10-10 | End: 2019-05-29

## 2018-11-28 RX ORDER — SIMETHICONE 125 MG
125 TABLET,CHEWABLE ORAL EVERY 6 HOURS PRN
Qty: 20 TABLET | Refills: 0 | Status: SHIPPED | OUTPATIENT
Start: 2018-11-28 | End: 2020-07-10

## 2018-11-28 NOTE — PROGRESS NOTES
Bella Richardson is a 30year old female. Patient presents with:  Abdominal Pain: pt c/o of constipation      HPI:     HPI  Patient is here for above. Reports that she has been having constipation and abdominal flatulence for about 2 weeks.   She used t • Hernia, umbilical    • Migraines       Past Surgical History:   Procedure Laterality Date   • Electrocardiogram, complete  12/07/2013    scanned to media tab      Social History:  Social History    Tobacco Use      Smoking status: Never Smoker      Smo consider further testing. Other orders  -     simethicone 125 MG Oral Chew Tab; Chew 1 tablet (125 mg total) by mouth every 6 (six) hours as needed for FLATULENCE.  -     docusate sodium (COLACE) 100 MG Oral Cap;  Take 1 capsule (100 mg total) by mouth 2 (

## 2018-12-06 ENCOUNTER — PATIENT MESSAGE (OUTPATIENT)
Dept: NEUROLOGY | Facility: CLINIC | Age: 38
End: 2018-12-06

## 2018-12-06 NOTE — TELEPHONE ENCOUNTER
From: Bella Richardson  To: Hesham yRan MD  Sent: 12/6/2018 11:38 AM CST  Subject: Prescription Question    Good morning Dr. Addis Whitaker,    I hope you find yourself well. I wanted to update you on my migraines since I've been on the Aimovig injection.  I

## 2019-02-18 RX ORDER — RIZATRIPTAN BENZOATE 10 MG/1
TABLET, ORALLY DISINTEGRATING ORAL
Qty: 36 TABLET | Refills: 2 | OUTPATIENT
Start: 2019-02-18

## 2019-04-27 ENCOUNTER — HOSPITAL ENCOUNTER (EMERGENCY)
Facility: HOSPITAL | Age: 39
Discharge: HOME | End: 2019-04-28
Attending: EMERGENCY MEDICINE
Payer: COMMERCIAL

## 2019-04-27 ENCOUNTER — APPOINTMENT (OUTPATIENT)
Dept: CT IMAGING | Facility: HOSPITAL | Age: 39
End: 2019-04-27
Attending: EMERGENCY MEDICINE
Payer: COMMERCIAL

## 2019-04-27 DIAGNOSIS — R10.32 ABDOMINAL PAIN, LEFT LOWER QUADRANT: Primary | ICD-10-CM

## 2019-04-27 PROCEDURE — 96376 TX/PRO/DX INJ SAME DRUG ADON: CPT

## 2019-04-27 PROCEDURE — 96374 THER/PROPH/DIAG INJ IV PUSH: CPT

## 2019-04-27 PROCEDURE — 83690 ASSAY OF LIPASE: CPT

## 2019-04-27 PROCEDURE — 81025 URINE PREGNANCY TEST: CPT

## 2019-04-27 PROCEDURE — 85025 COMPLETE CBC W/AUTO DIFF WBC: CPT | Performed by: EMERGENCY MEDICINE

## 2019-04-27 PROCEDURE — 80053 COMPREHEN METABOLIC PANEL: CPT | Performed by: EMERGENCY MEDICINE

## 2019-04-27 PROCEDURE — 80053 COMPREHEN METABOLIC PANEL: CPT

## 2019-04-27 PROCEDURE — 96361 HYDRATE IV INFUSION ADD-ON: CPT

## 2019-04-27 PROCEDURE — 99285 EMERGENCY DEPT VISIT HI MDM: CPT

## 2019-04-27 PROCEDURE — 96375 TX/PRO/DX INJ NEW DRUG ADDON: CPT

## 2019-04-27 PROCEDURE — 74177 CT ABD & PELVIS W/CONTRAST: CPT | Performed by: EMERGENCY MEDICINE

## 2019-04-27 PROCEDURE — 83690 ASSAY OF LIPASE: CPT | Performed by: EMERGENCY MEDICINE

## 2019-04-27 PROCEDURE — 81001 URINALYSIS AUTO W/SCOPE: CPT | Performed by: EMERGENCY MEDICINE

## 2019-04-27 PROCEDURE — 85025 COMPLETE CBC W/AUTO DIFF WBC: CPT

## 2019-04-27 RX ORDER — MORPHINE SULFATE 4 MG/ML
4 INJECTION, SOLUTION INTRAMUSCULAR; INTRAVENOUS ONCE
Status: COMPLETED | OUTPATIENT
Start: 2019-04-27 | End: 2019-04-27

## 2019-04-27 RX ORDER — ONDANSETRON 2 MG/ML
4 INJECTION INTRAMUSCULAR; INTRAVENOUS ONCE
Status: COMPLETED | OUTPATIENT
Start: 2019-04-27 | End: 2019-04-27

## 2019-04-27 RX ORDER — HYDROMORPHONE HYDROCHLORIDE 1 MG/ML
0.2 INJECTION, SOLUTION INTRAMUSCULAR; INTRAVENOUS; SUBCUTANEOUS ONCE
Status: COMPLETED | OUTPATIENT
Start: 2019-04-27 | End: 2019-04-27

## 2019-04-28 ENCOUNTER — APPOINTMENT (OUTPATIENT)
Dept: ULTRASOUND IMAGING | Facility: HOSPITAL | Age: 39
End: 2019-04-28
Attending: EMERGENCY MEDICINE
Payer: COMMERCIAL

## 2019-04-28 VITALS
RESPIRATION RATE: 18 BRPM | SYSTOLIC BLOOD PRESSURE: 100 MMHG | TEMPERATURE: 98 F | WEIGHT: 162 LBS | OXYGEN SATURATION: 97 % | BODY MASS INDEX: 31.8 KG/M2 | HEART RATE: 82 BPM | DIASTOLIC BLOOD PRESSURE: 62 MMHG | HEIGHT: 60 IN

## 2019-04-28 PROCEDURE — 76856 US EXAM PELVIC COMPLETE: CPT | Performed by: EMERGENCY MEDICINE

## 2019-04-28 PROCEDURE — 93975 VASCULAR STUDY: CPT | Performed by: EMERGENCY MEDICINE

## 2019-04-28 PROCEDURE — 76830 TRANSVAGINAL US NON-OB: CPT | Performed by: EMERGENCY MEDICINE

## 2019-04-28 RX ORDER — ONDANSETRON 4 MG/1
TABLET, ORALLY DISINTEGRATING ORAL
Status: COMPLETED
Start: 2019-04-28 | End: 2019-04-28

## 2019-04-28 RX ORDER — ONDANSETRON 4 MG/1
4 TABLET, ORALLY DISINTEGRATING ORAL ONCE
Status: COMPLETED | OUTPATIENT
Start: 2019-04-28 | End: 2019-04-28

## 2019-04-28 RX ORDER — ONDANSETRON 4 MG/1
4 TABLET, ORALLY DISINTEGRATING ORAL EVERY 4 HOURS PRN
Qty: 10 TABLET | Refills: 0 | Status: SHIPPED | OUTPATIENT
Start: 2019-04-28 | End: 2019-05-05

## 2019-04-28 NOTE — ED NOTES
Assumed care of patient from triage who comes in with complaints of llq abd pain. md made aware of BP. New orders for CT placed.

## 2019-05-01 NOTE — ED PROVIDER NOTES
Patient Seen in: [facility] Emergency Department    History   Patient presents with:  Abdominal Pain    Stated Complaint: L side abd pain    HPI    68-year-old female presents for evaluation of abdominal pain.   Patient reports left lower quadrant She exhibits no distension. There is tenderness in the left lower quadrant. There is no rebound and no guarding. Musculoskeletal: Normal range of motion. Neurological: She is alert and oriented to person, place, and time.    No focal deficit   Skin: Ski TECHNIQUE: Pelvic ultrasound using transabdominal and transvaginal     technique.   A transvaginal scan was performed for improved tissue     characterization of the uterus, enhanced visualization of the endometrial     canal, and ovarian/adnexal evaluation COMPARISON: [address] Sophy Cardona     (PYS=[id number])+(MCT [id number]), 4/28/2019, 0:34. INDICATIONS: Left lower quadrant abdominal pain.          TECHNIQUE: Multidetector CT images of the abdomen and pelvis were obtained 1. No acute intra-abdominal process is identified. The etiology of the     patient's symptoms is unclear from this study. 2. Lesser incidental findings as above.                    A preliminary report was issued by the [address] [place] [address] Radiology teleradiolo patient  Factors limiting our ability to obtain a history: None    Medical Record Review: I personally reviewed available prior medical records for any recent pertinent discharge summaries, testing, and procedures and reviewed those reports.      Complicati duplicate medications found. Please discuss with provider.

## 2019-05-03 NOTE — TELEPHONE ENCOUNTER
Aimovig 140 dose 70 mg/ml. Inject 2ml into the skin every 30 days. #2, 3 refills.     Last filled-11/12/2018    LOV-3/6/2018  NOV-none    mychart message patient to inform her she will need to schedule a NOV

## 2019-05-29 ENCOUNTER — OFFICE VISIT (OUTPATIENT)
Dept: NEUROLOGY | Facility: CLINIC | Age: 39
End: 2019-05-29
Payer: COMMERCIAL

## 2019-05-29 VITALS
DIASTOLIC BLOOD PRESSURE: 70 MMHG | RESPIRATION RATE: 16 BRPM | SYSTOLIC BLOOD PRESSURE: 118 MMHG | WEIGHT: 160 LBS | BODY MASS INDEX: 31.41 KG/M2 | HEIGHT: 60 IN

## 2019-05-29 DIAGNOSIS — G43.009 MIGRAINE WITHOUT AURA AND WITHOUT STATUS MIGRAINOSUS, NOT INTRACTABLE: Primary | ICD-10-CM

## 2019-05-29 PROCEDURE — 99213 OFFICE O/P EST LOW 20 MIN: CPT | Performed by: OTHER

## 2019-05-29 NOTE — TELEPHONE ENCOUNTER
Medication request: Aimovig 140 Dose (70mg/ML SC), 1mL, 3 refills  Inject 2mL into skin every 30 days    LOV: 5/29/19  NOV: Not yet scheduled

## 2019-05-30 NOTE — TELEPHONE ENCOUNTER
Received call from [place] Beal at [facility] - states insurance will not cover 70mg x 2 per tx. Provided verbal order for 140mg/mL strength with 3 refills. Noted in historical med accordingly.

## 2019-06-01 ENCOUNTER — PATIENT MESSAGE (OUTPATIENT)
Dept: NEUROLOGY | Facility: CLINIC | Age: 39
End: 2019-06-01

## 2019-06-03 RX ORDER — RIZATRIPTAN BENZOATE 10 MG/1
10 TABLET, ORALLY DISINTEGRATING ORAL AS NEEDED
Qty: 36 TABLET | Refills: 3 | Status: SHIPPED | OUTPATIENT
Start: 2019-06-03 | End: 2019-07-03

## 2019-06-03 NOTE — PROGRESS NOTES
Refill request for maxalt 10 mg, take 1 tab at onset if migraine, #36, 3 refills    LOV: 5/29/19  NOV: none

## 2019-06-03 NOTE — TELEPHONE ENCOUNTER
From: Taylor Batres  To: Ivana Maxwell MD  Sent: 6/1/2019 6:32 AM CDT  Subject: Cinda Agarwal,    I received the new prescription for the Aimovig 140mg, but not for the Maxalt(out of refills).  I would prefer the Maxalt be

## 2019-06-04 RX ORDER — RIZATRIPTAN BENZOATE 10 MG/1
TABLET, ORALLY DISINTEGRATING ORAL
Qty: 9 TABLET | Refills: 3 | Status: SHIPPED | OUTPATIENT
Start: 2019-06-04 | End: 2020-11-30

## 2019-06-04 NOTE — PROGRESS NOTES
Neurology Outpatient progress note    Sony Peres : 1980   HPI:     Sony Peres is a 41year old female who is being seen in follow-up. I saw her in clinic last in 2018.   In the interim, she was unable to tolerate topiramate tablet Rfl: 3   Erenumab-aooe 140 MG/ML SC SOAJ Inject 1 mL (140 mg total) into the skin once for 1 dose. Disp: 1 mL Rfl: 3   simethicone 125 MG Oral Chew Tab Chew 1 tablet (125 mg total) by mouth every 6 (six) hours as needed for FLATULENCE.  Disp: 20 tabl wheezing  CARDIOVASCULAR: no chest pain, no palpitations  GI: no nausea, no vomiting  GENITAL/: no dysuria, no frequency  MUSCULOSKELETAL: no muscle pain, no muscle weakness   PSYCH: no depression, no anxiety  NEURO: see HPI    EXAM:     Vitals:  /

## 2019-11-05 ENCOUNTER — PATIENT MESSAGE (OUTPATIENT)
Dept: INTERNAL MEDICINE CLINIC | Facility: CLINIC | Age: 39
End: 2019-11-05

## 2019-11-05 NOTE — TELEPHONE ENCOUNTER
From: Jonathan Wallace  To: Raina Morelos MD  Sent: 11/5/2019 9:55 AM CST  Subject: Test Results Question    Good morning,    I would like to request a letter including my most recent Titer results for MMR, Varicella, and Hep B.  I also need proof of

## 2020-02-24 NOTE — TELEPHONE ENCOUNTER
Refill request for aimovig 140 mg, inject 1 ml every 28 days, #1, 3 refills    LOV: 5/29/19  NOV: none

## 2020-06-25 RX ORDER — ERENUMAB-AOOE 140 MG/ML
INJECTION, SOLUTION SUBCUTANEOUS
Qty: 1 PEN | Refills: 2 | OUTPATIENT
Start: 2020-06-25

## 2020-07-10 ENCOUNTER — OFFICE VISIT (OUTPATIENT)
Dept: NEUROLOGY | Facility: CLINIC | Age: 40
End: 2020-07-10
Payer: COMMERCIAL

## 2020-07-10 VITALS — BODY MASS INDEX: 32.39 KG/M2 | WEIGHT: 165 LBS | HEIGHT: 60 IN

## 2020-07-10 DIAGNOSIS — G43.009 MIGRAINE WITHOUT AURA AND WITHOUT STATUS MIGRAINOSUS, NOT INTRACTABLE: Primary | ICD-10-CM

## 2020-07-10 PROCEDURE — 3008F BODY MASS INDEX DOCD: CPT | Performed by: OTHER

## 2020-07-10 PROCEDURE — 99213 OFFICE O/P EST LOW 20 MIN: CPT | Performed by: OTHER

## 2020-07-10 RX ORDER — IBUPROFEN 200 MG
200 TABLET ORAL EVERY 6 HOURS PRN
COMMUNITY

## 2020-07-10 NOTE — PROGRESS NOTES
Neurology Outpatient progress note    Jairo Joshi : 1980   HPI:     Jairo Joshi is a 42year old female who is being seen in follow-up. Since last visit, she has been doing very well on the [address] [place] [address].   She takes rizatriptan PRN head Sexual Activity      Alcohol use: No        Alcohol/week: 0.0 standard drinks      Drug use: No      Sexual activity: Yes        Partners: Male        Birth control/protection: Vasectomy    Other Topics      Concerns:        Caffeine Concern:  Yes

## 2020-08-17 ENCOUNTER — OFFICE VISIT (OUTPATIENT)
Dept: OBGYN CLINIC | Facility: CLINIC | Age: 40
End: 2020-08-17
Payer: COMMERCIAL

## 2020-08-17 VITALS
SYSTOLIC BLOOD PRESSURE: 112 MMHG | WEIGHT: 167 LBS | HEIGHT: 60 IN | DIASTOLIC BLOOD PRESSURE: 74 MMHG | BODY MASS INDEX: 32.79 KG/M2

## 2020-08-17 DIAGNOSIS — R10.2 PELVIC PRESSURE IN FEMALE: ICD-10-CM

## 2020-08-17 DIAGNOSIS — R33.9 URINE RETENTION: ICD-10-CM

## 2020-08-17 DIAGNOSIS — Z11.3 SCREENING EXAMINATION FOR STD (SEXUALLY TRANSMITTED DISEASE): Primary | ICD-10-CM

## 2020-08-17 PROCEDURE — 3074F SYST BP LT 130 MM HG: CPT | Performed by: ADVANCED PRACTICE MIDWIFE

## 2020-08-17 PROCEDURE — 99213 OFFICE O/P EST LOW 20 MIN: CPT | Performed by: ADVANCED PRACTICE MIDWIFE

## 2020-08-17 PROCEDURE — 3078F DIAST BP <80 MM HG: CPT | Performed by: ADVANCED PRACTICE MIDWIFE

## 2020-08-17 PROCEDURE — 3008F BODY MASS INDEX DOCD: CPT | Performed by: ADVANCED PRACTICE MIDWIFE

## 2020-08-17 NOTE — PROGRESS NOTES
HPI:    Patient ID: Nagi Pitt is a 42year old female. July 31 was using bathroom and noticed discomfort when wiping. This occurred after intercourse. It felt like swelling. Then 2 days later felt more pressure and swelling in the area.   Saw External genitalia visually normal, expresses discomfort when palpating under urethra but no d/c noted    Expressed discomfort with movement of cervix but only mildly                ASSESSMENT/PLAN:   Screening examination for std (sexually transmitted dis

## 2020-08-18 LAB
C TRACH DNA SPEC QL NAA+PROBE: NEGATIVE
N GONORRHOEA DNA SPEC QL NAA+PROBE: NEGATIVE

## 2020-08-19 LAB
GENITAL VAGINOSIS SCREEN: NEGATIVE
TRICHOMONAS SCREEN: NEGATIVE

## 2020-11-06 ENCOUNTER — OFFICE VISIT (OUTPATIENT)
Dept: FAMILY MEDICINE CLINIC | Facility: CLINIC | Age: 40
End: 2020-11-06
Payer: COMMERCIAL

## 2020-11-06 VITALS
DIASTOLIC BLOOD PRESSURE: 63 MMHG | RESPIRATION RATE: 16 BRPM | TEMPERATURE: 98 F | OXYGEN SATURATION: 98 % | HEART RATE: 82 BPM | HEIGHT: 60 IN | BODY MASS INDEX: 32.2 KG/M2 | SYSTOLIC BLOOD PRESSURE: 111 MMHG | WEIGHT: 164 LBS

## 2020-11-06 DIAGNOSIS — Z02.1 PHYSICAL EXAM, PRE-EMPLOYMENT: Primary | ICD-10-CM

## 2020-11-06 DIAGNOSIS — Z11.1 SCREENING FOR TUBERCULOSIS: ICD-10-CM

## 2020-11-06 PROCEDURE — 3008F BODY MASS INDEX DOCD: CPT | Performed by: PHYSICIAN ASSISTANT

## 2020-11-06 PROCEDURE — 3074F SYST BP LT 130 MM HG: CPT | Performed by: PHYSICIAN ASSISTANT

## 2020-11-06 PROCEDURE — 99395 PREV VISIT EST AGE 18-39: CPT | Performed by: PHYSICIAN ASSISTANT

## 2020-11-06 PROCEDURE — 99072 ADDL SUPL MATRL&STAF TM PHE: CPT | Performed by: PHYSICIAN ASSISTANT

## 2020-11-06 PROCEDURE — 81003 URINALYSIS AUTO W/O SCOPE: CPT | Performed by: PHYSICIAN ASSISTANT

## 2020-11-06 PROCEDURE — 86580 TB INTRADERMAL TEST: CPT | Performed by: PHYSICIAN ASSISTANT

## 2020-11-06 PROCEDURE — 3078F DIAST BP <80 MM HG: CPT | Performed by: PHYSICIAN ASSISTANT

## 2020-11-06 NOTE — PROGRESS NOTES
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     Patient has no concern left side       Social History:  Social History    Tobacco Use      Smoking status: Never Smoker      Smokeless tobacco: Never Used    Alcohol use: No      Alcohol/week: 0.0 standard drinks    Drug use: No     Occ: student. Exercise: none.   Diet: watches MUSCULOSKELETAL: back is not tender, ROM of the back fully intact  EXTREMITIES: no cyanosis, clubbing or edema  NEURO: alert & oriented x 3, cranial nerves 2-12 grossly intact, brachioradialis and patella reflex's 2/4 bilaterally    ASSESSMENT:     [facility]

## 2020-11-09 ENCOUNTER — OFFICE VISIT (OUTPATIENT)
Dept: FAMILY MEDICINE CLINIC | Facility: CLINIC | Age: 40
End: 2020-11-09
Payer: COMMERCIAL

## 2020-11-09 DIAGNOSIS — Z11.1 ENCOUNTER FOR PPD SKIN TEST READING: Primary | ICD-10-CM

## 2020-11-09 NOTE — PROGRESS NOTES
Patient presents for PPD read.      Recent Results (from the past 24 hour(s))   TB INTRADERMAL TEST    Collection Time: 11/09/20 11:31 AM   Result Value Ref Range    Date Given: 11/6/2020     Site: left forearm     INDURATION (PPD) 0.0 mm 0.0 - 11 mm

## 2020-11-30 NOTE — TELEPHONE ENCOUNTER
Refill request for rizatriptan 10 mg, take 1 tab at onset of migraine, #36, no refills    LOV: 7/10/20  NOV: None

## 2020-12-01 RX ORDER — RIZATRIPTAN BENZOATE 10 MG/1
TABLET, ORALLY DISINTEGRATING ORAL
Qty: 36 TABLET | Refills: 0 | Status: SHIPPED | OUTPATIENT
Start: 2020-12-01

## 2021-06-09 ENCOUNTER — OFFICE VISIT (OUTPATIENT)
Dept: FAMILY MEDICINE CLINIC | Facility: CLINIC | Age: 41
End: 2021-06-09

## 2021-06-09 VITALS
RESPIRATION RATE: 16 BRPM | DIASTOLIC BLOOD PRESSURE: 70 MMHG | HEART RATE: 73 BPM | TEMPERATURE: 100 F | HEIGHT: 60 IN | SYSTOLIC BLOOD PRESSURE: 100 MMHG | BODY MASS INDEX: 32 KG/M2 | WEIGHT: 163 LBS | OXYGEN SATURATION: 97 %

## 2021-06-09 DIAGNOSIS — Z02.1 PHYSICAL EXAM, PRE-EMPLOYMENT: Primary | ICD-10-CM

## 2021-06-09 PROCEDURE — 99395 PREV VISIT EST AGE 18-39: CPT | Performed by: PHYSICIAN ASSISTANT

## 2021-06-09 PROCEDURE — 3074F SYST BP LT 130 MM HG: CPT | Performed by: PHYSICIAN ASSISTANT

## 2021-06-09 PROCEDURE — 3078F DIAST BP <80 MM HG: CPT | Performed by: PHYSICIAN ASSISTANT

## 2021-06-09 PROCEDURE — 3008F BODY MASS INDEX DOCD: CPT | Performed by: PHYSICIAN ASSISTANT

## 2021-06-09 NOTE — PROGRESS NOTES
CHIEF COMPLAINT:     Employment Physical Exam    HPI:   Jose Ramirez is a 47year old female who presents for an employment physical exam.   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    Activity tolerance: lesions  EYES:denies blurred vision or double vision  HEENT: denies nasal congestion, sinus pain or ST  LUNGS: denies shortness of breath at rest on on exertion  CARDIOVASCULAR: denies chest pain or palpitations   GI: denies abdominal pain or heartburn.   Nino Adam symmetric     ASSESSMENT AND PLAN:   Agatha Leung is a 47year old female who presents for a employmentphysical exam.     ASSESSMENT: Physical exam, pre-employment  (primary encounter diagnosis)      PLAN: Patient was found free of any mental or phys

## 2021-11-09 ENCOUNTER — HOSPITAL ENCOUNTER (EMERGENCY)
Facility: HOSPITAL | Age: 41
Discharge: HOME | End: 2021-11-09
Attending: EMERGENCY MEDICINE
Payer: COMMERCIAL

## 2021-11-09 VITALS
OXYGEN SATURATION: 99 % | RESPIRATION RATE: 18 BRPM | TEMPERATURE: 98 F | HEART RATE: 87 BPM | DIASTOLIC BLOOD PRESSURE: 78 MMHG | SYSTOLIC BLOOD PRESSURE: 123 MMHG

## 2021-11-09 DIAGNOSIS — K21.9 GASTROESOPHAGEAL REFLUX DISEASE, UNSPECIFIED WHETHER ESOPHAGITIS PRESENT: Primary | ICD-10-CM

## 2021-11-09 PROCEDURE — 80048 BASIC METABOLIC PNL TOTAL CA: CPT | Performed by: EMERGENCY MEDICINE

## 2021-11-09 PROCEDURE — 93005 ELECTROCARDIOGRAM TRACING: CPT

## 2021-11-09 PROCEDURE — 93010 ELECTROCARDIOGRAM REPORT: CPT | Performed by: EMERGENCY MEDICINE

## 2021-11-09 PROCEDURE — 99284 EMERGENCY DEPT VISIT MOD MDM: CPT

## 2021-11-09 PROCEDURE — 85025 COMPLETE CBC W/AUTO DIFF WBC: CPT | Performed by: EMERGENCY MEDICINE

## 2021-11-09 PROCEDURE — 36415 COLL VENOUS BLD VENIPUNCTURE: CPT

## 2021-11-09 PROCEDURE — 84484 ASSAY OF TROPONIN QUANT: CPT | Performed by: EMERGENCY MEDICINE

## 2021-11-09 RX ORDER — FAMOTIDINE 20 MG/1
20 TABLET ORAL 2 TIMES DAILY PRN
Qty: 30 TABLET | Refills: 0 | Status: SHIPPED | OUTPATIENT
Start: 2021-11-09 | End: 2021-12-09

## 2021-11-09 NOTE — ED INITIAL ASSESSMENT (HPI)
Awake from sleep @0230 with reflux + L shoulder pain reports spicy food at 1400 Nothing to eat after 1800

## 2022-01-02 ENCOUNTER — HOSPITAL ENCOUNTER (OUTPATIENT)
Age: 42
Discharge: HOME | End: 2022-01-02
Payer: COMMERCIAL

## 2022-01-02 VITALS
DIASTOLIC BLOOD PRESSURE: 63 MMHG | SYSTOLIC BLOOD PRESSURE: 102 MMHG | HEART RATE: 78 BPM | OXYGEN SATURATION: 100 % | TEMPERATURE: 98 F | RESPIRATION RATE: 18 BRPM

## 2022-01-02 DIAGNOSIS — Z20.822 SUSPECTED 2019 NOVEL CORONAVIRUS INFECTION: ICD-10-CM

## 2022-01-02 DIAGNOSIS — R09.81 NASAL CONGESTION: Primary | ICD-10-CM

## 2022-01-02 PROCEDURE — 99212 OFFICE O/P EST SF 10 MIN: CPT | Performed by: NURSE PRACTITIONER

## 2022-01-02 NOTE — ED PROVIDER NOTES
Patient Seen in: [facility]      History   Patient presents with:  Covid-19 Test    Stated Complaint: Testing    Subjective:   Patient presents to the immediate care requesting a COVID-19 test.  States that she has mild nasal congestion head Temp 98.4 °F (36.9 °C)   Temp src Temporal   SpO2 100 %   O2 Device None (Room air)       Current:/63   Pulse 78   Temp 98.4 °F (36.9 °C) (Temporal)   Resp 18   LMP 12/13/2021 (Approximate)   SpO2 100%         Physical Exam  Vitals and nursing note negative test.  Advised to return if the patient develops persistent fevers, headache, neck pain, cough, difficulty breathing, shortness of breath, or any new or worsening symptoms. Strict return precautions were given.   COVID-19 discharge instructions we

## 2022-01-04 LAB — SARS-COV-2 RNA RESP QL NAA+PROBE: DETECTED

## 2022-01-14 NOTE — TELEPHONE ENCOUNTER
Aimovig paperwork has been filled out, clipped to insurance card, and placed on Dr. Nolan Brain desk. Once completed, will be faxed accordingly.
Forms have been faxed to [address] [place] [address] at [phone], copied and sent to scanning with copy of insurance card. Originals were placed in medication form bin.
If we can please initiate Aimovig paperwork
Received fax from [facility] form notification    --see nurses bin
aimovig form signed
refaxed start form with quantity on the form.
Abdomen soft, non-tender, no guarding.

## 2022-02-09 ENCOUNTER — OFFICE VISIT (OUTPATIENT)
Dept: NEUROLOGY | Facility: CLINIC | Age: 42
End: 2022-02-09
Payer: COMMERCIAL

## 2022-02-09 ENCOUNTER — LAB ENCOUNTER (OUTPATIENT)
Dept: LAB | Facility: HOSPITAL | Age: 42
End: 2022-02-09
Attending: Other
Payer: COMMERCIAL

## 2022-02-09 VITALS
SYSTOLIC BLOOD PRESSURE: 104 MMHG | HEART RATE: 71 BPM | DIASTOLIC BLOOD PRESSURE: 62 MMHG | HEIGHT: 60 IN | WEIGHT: 160 LBS | BODY MASS INDEX: 31.41 KG/M2

## 2022-02-09 DIAGNOSIS — G43.711 INTRACTABLE CHRONIC MIGRAINE WITHOUT AURA AND WITH STATUS MIGRAINOSUS: Primary | ICD-10-CM

## 2022-02-09 DIAGNOSIS — G43.711 INTRACTABLE CHRONIC MIGRAINE WITHOUT AURA AND WITH STATUS MIGRAINOSUS: ICD-10-CM

## 2022-02-09 LAB
ALBUMIN SERPL-MCNC: 4 G/DL (ref 3.4–5)
ALP LIVER SERPL-CCNC: 81 U/L
ALT SERPL-CCNC: 22 U/L
AST SERPL-CCNC: 12 U/L (ref 15–37)
BILIRUB DIRECT SERPL-MCNC: 0.2 MG/DL (ref 0–0.2)
BILIRUB SERPL-MCNC: 0.6 MG/DL (ref 0.1–2)
PROT SERPL-MCNC: 7.3 G/DL (ref 6.4–8.2)

## 2022-02-09 PROCEDURE — 3078F DIAST BP <80 MM HG: CPT | Performed by: OTHER

## 2022-02-09 PROCEDURE — 80076 HEPATIC FUNCTION PANEL: CPT

## 2022-02-09 PROCEDURE — 99215 OFFICE O/P EST HI 40 MIN: CPT | Performed by: OTHER

## 2022-02-09 PROCEDURE — 3008F BODY MASS INDEX DOCD: CPT | Performed by: OTHER

## 2022-02-09 PROCEDURE — 3074F SYST BP LT 130 MM HG: CPT | Performed by: OTHER

## 2022-02-09 PROCEDURE — 36415 COLL VENOUS BLD VENIPUNCTURE: CPT

## 2022-02-09 RX ORDER — DIVALPROEX SODIUM 500 MG/1
TABLET, EXTENDED RELEASE ORAL
Qty: 15 TABLET | Refills: 0 | Status: SHIPPED | OUTPATIENT
Start: 2022-02-09 | End: 2022-02-19

## 2022-02-09 RX ORDER — ERGOCALCIFEROL 1.25 MG/1
50000 CAPSULE ORAL WEEKLY
COMMUNITY
Start: 2022-01-25

## 2022-02-09 RX ORDER — FREMANEZUMAB-VFRM 225 MG/1.5ML
225 INJECTION SUBCUTANEOUS
Qty: 1.5 ML | Refills: 3 | Status: SHIPPED | OUTPATIENT
Start: 2022-02-09 | End: 2023-02-09

## 2022-02-09 RX ORDER — ELETRIPTAN HYDROBROMIDE 40 MG/1
TABLET, FILM COATED ORAL
Qty: 8 TABLET | Refills: 0 | Status: SHIPPED | OUTPATIENT
Start: 2022-02-09

## 2022-05-05 RX ORDER — ELETRIPTAN HYDROBROMIDE 40 MG/1
TABLET, FILM COATED ORAL
Qty: 8 TABLET | Refills: 0 | Status: SHIPPED | OUTPATIENT
Start: 2022-05-05

## 2022-05-05 NOTE — TELEPHONE ENCOUNTER
Refill request for eletriptan 40 mg, take 1 tab at onset of migraine, #8, no refills    LOV: 2/9/22  NOV: 6/2/22  Last refilled on 2/9/22

## 2022-06-02 ENCOUNTER — IMMUNIZATION (OUTPATIENT)
Dept: LAB | Age: 42
End: 2022-06-02
Attending: EMERGENCY MEDICINE
Payer: COMMERCIAL

## 2022-06-02 ENCOUNTER — OFFICE VISIT (OUTPATIENT)
Dept: NEUROLOGY | Facility: CLINIC | Age: 42
End: 2022-06-02
Payer: COMMERCIAL

## 2022-06-02 VITALS
DIASTOLIC BLOOD PRESSURE: 66 MMHG | BODY MASS INDEX: 31.41 KG/M2 | WEIGHT: 160 LBS | SYSTOLIC BLOOD PRESSURE: 120 MMHG | HEART RATE: 74 BPM | HEIGHT: 60 IN

## 2022-06-02 DIAGNOSIS — G43.019 INTRACTABLE MIGRAINE WITHOUT AURA AND WITHOUT STATUS MIGRAINOSUS: Primary | ICD-10-CM

## 2022-06-02 DIAGNOSIS — G43.711 INTRACTABLE CHRONIC MIGRAINE WITHOUT AURA AND WITH STATUS MIGRAINOSUS: ICD-10-CM

## 2022-06-02 DIAGNOSIS — G43.009 MIGRAINE WITHOUT AURA AND WITHOUT STATUS MIGRAINOSUS, NOT INTRACTABLE: Primary | ICD-10-CM

## 2022-06-02 DIAGNOSIS — Z23 NEED FOR VACCINATION: Primary | ICD-10-CM

## 2022-06-02 PROCEDURE — 3078F DIAST BP <80 MM HG: CPT | Performed by: OTHER

## 2022-06-02 PROCEDURE — 3008F BODY MASS INDEX DOCD: CPT | Performed by: OTHER

## 2022-06-02 PROCEDURE — 99213 OFFICE O/P EST LOW 20 MIN: CPT | Performed by: OTHER

## 2022-06-02 PROCEDURE — 3074F SYST BP LT 130 MM HG: CPT | Performed by: OTHER

## 2022-06-02 PROCEDURE — 0054A SARSCOV2 VAC 30MCG TRS SUCR: CPT

## 2022-06-02 RX ORDER — FREMANEZUMAB-VFRM 225 MG/1.5ML
225 INJECTION SUBCUTANEOUS
Qty: 1.5 ML | Refills: 3 | Status: SHIPPED | OUTPATIENT
Start: 2022-06-02 | End: 2023-06-02

## 2022-06-02 RX ORDER — ELETRIPTAN HYDROBROMIDE 40 MG/1
TABLET, FILM COATED ORAL
Qty: 8 TABLET | Refills: 0 | Status: SHIPPED | OUTPATIENT
Start: 2022-06-02 | End: 2022-06-02

## 2022-06-02 RX ORDER — ELETRIPTAN HYDROBROMIDE 40 MG/1
40 TABLET, FILM COATED ORAL AS NEEDED
Qty: 8 TABLET | Refills: 3 | Status: SHIPPED | OUTPATIENT
Start: 2022-06-02

## 2022-06-02 RX ORDER — PROCHLORPERAZINE MALEATE 10 MG
10 TABLET ORAL EVERY 6 HOURS PRN
Qty: 45 TABLET | Refills: 0 | Status: SHIPPED | OUTPATIENT
Start: 2022-06-02 | End: 2022-07-02

## 2022-06-02 NOTE — TELEPHONE ENCOUNTER
Medication: ELETRIPTAN HYDROBROMIDE 40 MG Oral Tab     Date of last refill: 5/5/22 (#8/0)     Last office visit: 2/9/22  Date next office visit scheduled:    Future Appointments   Date Time Provider Jossy Denson   6/2/2022 11:00 AM Shu Montalvo, [address] [facility]

## 2022-06-02 NOTE — PATIENT INSTRUCTIONS
-Continue Ajovy  -Eletriptan, Compazine and Benadryl as needed for migraines   -Follow up in 6 months or sooner if needed. -If you develop sudden onset loss of vision, double vision, room spinning/world spinning sensation, inability to speak or understand others who are speaking to you, slurred speech, balance problems, weakness on one side of your body, numbness on one side of your body or worst headache you ever had, please seek out emergency medical attention via 911 for the nearest emergency room to be evaluated for possible stroke. -MyChart or call office with any questions or concerns. Thank you for coming to see me today. The easiest way to communicate with me is via [employer]. [employer] is meant for simple questions regarding medications, possible side effects, or other simple straight forward questions in limited sentences, rather than multiple paragraphs of discussion. [employer] is not meant for, or efficient for these complex questions, extensive questions, extensive medication adjustments, complex new symptoms or concerns. These issues beyond simple questions require a follow up visit with myself. Refills:  Please pay attention to when your refills will need to be renewed. Due to the volume of phone calls daily, this could potentially take a few days, although we certainly try to honor your refill requests as soon as we can. You should call at least 1 week in advance of needing a refill to ensure you do not run out of medication. Keep in mind that refill requests on Fridays may not be filled until the following week.

## 2022-07-02 DIAGNOSIS — G43.019 INTRACTABLE MIGRAINE WITHOUT AURA AND WITHOUT STATUS MIGRAINOSUS: ICD-10-CM

## 2022-07-05 RX ORDER — ELETRIPTAN HYDROBROMIDE 40 MG/1
TABLET, FILM COATED ORAL
Qty: 8 TABLET | Refills: 3 | OUTPATIENT
Start: 2022-07-05

## 2022-08-31 DIAGNOSIS — G43.019 INTRACTABLE MIGRAINE WITHOUT AURA AND WITHOUT STATUS MIGRAINOSUS: ICD-10-CM

## 2022-08-31 RX ORDER — FREMANEZUMAB-VFRM 225 MG/1.5ML
225 INJECTION SUBCUTANEOUS
Qty: 1.5 ML | Refills: 3 | Status: SHIPPED | OUTPATIENT
Start: 2022-08-31 | End: 2023-08-31

## 2022-08-31 NOTE — TELEPHONE ENCOUNTER
Refill request for ajovy 225 mg/1.5 mL, inject 1 pen every 30 days, #1, 3 reflls    LOV: 6/2/22  NOV: none  Last refilled on 6/2/22 with 3 refills

## 2022-12-09 DIAGNOSIS — G43.019 INTRACTABLE MIGRAINE WITHOUT AURA AND WITHOUT STATUS MIGRAINOSUS: ICD-10-CM

## 2022-12-09 RX ORDER — FREMANEZUMAB-VFRM 225 MG/1.5ML
225 INJECTION SUBCUTANEOUS
Qty: 1.5 ML | Refills: 1 | Status: SHIPPED | OUTPATIENT
Start: 2022-12-09 | End: 2023-12-09

## 2022-12-13 DIAGNOSIS — G43.019 INTRACTABLE MIGRAINE WITHOUT AURA AND WITHOUT STATUS MIGRAINOSUS: ICD-10-CM

## 2022-12-13 RX ORDER — ELETRIPTAN HYDROBROMIDE 40 MG/1
40 TABLET, FILM COATED ORAL AS NEEDED
Qty: 8 TABLET | Refills: 3 | Status: SHIPPED | OUTPATIENT
Start: 2022-12-13

## 2022-12-13 NOTE — TELEPHONE ENCOUNTER
Refill request for eletriptan 40 mg, take 1 tab at onset of migraine, #8, 3 refills    LOV: 6/6/22  NOV: none  Last refilled on 6/2/22 for 30 day supply with 3 refills

## 2023-01-26 ENCOUNTER — TELEPHONE (OUTPATIENT)
Dept: NEUROLOGY | Facility: CLINIC | Age: 43
End: 2023-01-26

## 2023-03-30 ENCOUNTER — TELEPHONE (OUTPATIENT)
Dept: FAMILY MEDICINE CLINIC | Facility: CLINIC | Age: 43
End: 2023-03-30

## 2023-04-02 ENCOUNTER — PATIENT OUTREACH (OUTPATIENT)
Dept: CASE MANAGEMENT | Age: 43
End: 2023-04-02

## 2023-04-02 NOTE — PROCEDURES
The office order for PCP removal request is Approved and finalized on April 2, 2023.     Thanks,  [facility] [employer]
20-Nov-2018 15:44

## 2023-06-17 DIAGNOSIS — G43.019 INTRACTABLE MIGRAINE WITHOUT AURA AND WITHOUT STATUS MIGRAINOSUS: ICD-10-CM

## 2023-06-19 RX ORDER — FREMANEZUMAB-VFRM 225 MG/1.5ML
225 INJECTION SUBCUTANEOUS
Qty: 1.5 ML | Refills: 3 | Status: SHIPPED | OUTPATIENT
Start: 2023-06-19 | End: 2024-06-18

## 2023-06-19 RX ORDER — FREMANEZUMAB-VFRM 225 MG/1.5ML
225 INJECTION SUBCUTANEOUS
Qty: 1.5 ML | Refills: 0 | Status: SHIPPED | OUTPATIENT
Start: 2023-06-19 | End: 2023-06-19

## 2023-06-19 NOTE — TELEPHONE ENCOUNTER
LOV 6/2/22   NOV not on file, needs appt    Refill request for pt AJOVY 225 MG/1.5ML Subcutaneous Solution Auto-injector , reviewed by RN and routed to provider for review.

## 2023-11-16 ENCOUNTER — PATIENT MESSAGE (OUTPATIENT)
Dept: NEUROLOGY | Facility: CLINIC | Age: 43
End: 2023-11-16

## 2023-12-27 ENCOUNTER — OFFICE VISIT (OUTPATIENT)
Dept: NEUROLOGY | Facility: CLINIC | Age: 43
End: 2023-12-27
Payer: COMMERCIAL

## 2023-12-27 VITALS
HEART RATE: 75 BPM | BODY MASS INDEX: 33.77 KG/M2 | WEIGHT: 172 LBS | DIASTOLIC BLOOD PRESSURE: 71 MMHG | HEIGHT: 60 IN | SYSTOLIC BLOOD PRESSURE: 101 MMHG

## 2023-12-27 DIAGNOSIS — G43.009 MIGRAINE WITHOUT AURA AND WITHOUT STATUS MIGRAINOSUS, NOT INTRACTABLE: ICD-10-CM

## 2023-12-27 PROCEDURE — 99213 OFFICE O/P EST LOW 20 MIN: CPT | Performed by: OTHER

## 2023-12-27 PROCEDURE — 3008F BODY MASS INDEX DOCD: CPT | Performed by: OTHER

## 2023-12-27 PROCEDURE — 3074F SYST BP LT 130 MM HG: CPT | Performed by: OTHER

## 2023-12-27 PROCEDURE — 3078F DIAST BP <80 MM HG: CPT | Performed by: OTHER

## 2023-12-27 RX ORDER — ELETRIPTAN HYDROBROMIDE 40 MG/1
40 TABLET, FILM COATED ORAL AS NEEDED
Qty: 8 TABLET | Refills: 3 | Status: SHIPPED | OUTPATIENT
Start: 2023-12-27 | End: 2023-12-27

## 2023-12-27 RX ORDER — FREMANEZUMAB-VFRM 225 MG/1.5ML
225 INJECTION SUBCUTANEOUS
Qty: 1.5 ML | Refills: 3 | Status: SHIPPED | OUTPATIENT
Start: 2023-12-27 | End: 2024-12-26

## 2023-12-27 RX ORDER — ELETRIPTAN HYDROBROMIDE 40 MG/1
40 TABLET, FILM COATED ORAL AS NEEDED
Qty: 8 TABLET | Refills: 5 | Status: SHIPPED | OUTPATIENT
Start: 2023-12-27

## 2024-01-22 ENCOUNTER — TELEPHONE (OUTPATIENT)
Facility: CLINIC | Age: 44
End: 2024-01-22

## 2024-01-22 ENCOUNTER — OFFICE VISIT (OUTPATIENT)
Facility: CLINIC | Age: 44
End: 2024-01-22
Payer: COMMERCIAL

## 2024-01-22 VITALS
SYSTOLIC BLOOD PRESSURE: 96 MMHG | DIASTOLIC BLOOD PRESSURE: 67 MMHG | HEART RATE: 73 BPM | HEIGHT: 60 IN | WEIGHT: 163 LBS | BODY MASS INDEX: 32 KG/M2

## 2024-01-22 DIAGNOSIS — Z87.19 HISTORY OF DIVERTICULITIS: ICD-10-CM

## 2024-01-22 DIAGNOSIS — R10.9 ABDOMINAL PAIN, UNSPECIFIED ABDOMINAL LOCATION: ICD-10-CM

## 2024-01-22 DIAGNOSIS — R13.19 ESOPHAGEAL DYSPHAGIA: ICD-10-CM

## 2024-01-22 DIAGNOSIS — K57.92 DIVERTICULITIS: ICD-10-CM

## 2024-01-22 DIAGNOSIS — R10.32 LLQ ABDOMINAL PAIN: Primary | ICD-10-CM

## 2024-01-22 DIAGNOSIS — K21.9 GASTROESOPHAGEAL REFLUX DISEASE, UNSPECIFIED WHETHER ESOPHAGITIS PRESENT: ICD-10-CM

## 2024-01-22 DIAGNOSIS — R13.19 ESOPHAGEAL DYSPHAGIA: Primary | ICD-10-CM

## 2024-01-22 PROCEDURE — 3074F SYST BP LT 130 MM HG: CPT | Performed by: INTERNAL MEDICINE

## 2024-01-22 PROCEDURE — 3078F DIAST BP <80 MM HG: CPT | Performed by: INTERNAL MEDICINE

## 2024-01-22 PROCEDURE — 99244 OFF/OP CNSLTJ NEW/EST MOD 40: CPT | Performed by: INTERNAL MEDICINE

## 2024-01-22 PROCEDURE — 3008F BODY MASS INDEX DOCD: CPT | Performed by: INTERNAL MEDICINE

## 2024-01-22 RX ORDER — POLYETHYLENE GLYCOL 3350, SODIUM CHLORIDE, SODIUM BICARBONATE, POTASSIUM CHLORIDE 420; 11.2; 5.72; 1.48 G/4L; G/4L; G/4L; G/4L
POWDER, FOR SOLUTION ORAL
Qty: 4000 ML | Refills: 0 | Status: SHIPPED | OUTPATIENT
Start: 2024-01-22

## 2024-01-22 RX ORDER — OMEPRAZOLE 40 MG/1
40 CAPSULE, DELAYED RELEASE ORAL DAILY
Qty: 90 CAPSULE | Refills: 3 | Status: SHIPPED | OUTPATIENT
Start: 2024-01-22

## 2024-01-22 RX ORDER — RUFINAMIDE 40 MG/ML
1 SUSPENSION ORAL DAILY
COMMUNITY

## 2024-01-22 NOTE — PATIENT INSTRUCTIONS
1. Schedule upper endoscopy (EGD) with possible balloon dilatation and colonoscopy with MAC [Diagnosis: dysphagia, acid reflux, possible hx diverticulitis, abdominal pain].    2.  bowel prep from pharmacy (split dose trilyte). If your prescription is not available and/or is cost-prohibitive, please contact the office as soon as possible to ensure you receive a bowel prep before your procedure.     3. Medication adjustments: Continue ALL medications as usual    4. Read all bowel prep instructions carefully.    5. AVOID seeds, nuts, popcorn, and raw fruits and vegetables (cooked is okay) for 2-3 days before procedure.    6. If you start any NEW medication after your visit today, please notify us. Certain medications will need to be held before the procedure or the procedure cannot be performed.     7. You will need a ride home from your procedure since you are receiving sedation. Please ensure you will have an available ride home or the procedure cannot be performed.           Lifestyle and dietary modifications were discussed today including but not limited to:     *Elevated head of bed at night given nocturnal symptoms     *Refrain from laying down after consuming a meal and do not eat meals 2-3 hours before bedtime.     *Avoid tight clothing or belts.      *Weight loss as able.      *Eliminate dietary triggers (examples: caffeine, chocolate, spicy foods, fried/fatty foods, peppermint and carbonated beverages).     *Avoid tobacco and alcohol as both can worsen acid reflux (reduces to pressure of the lower esophageal valve and smoking also reduces saliva production).     *Consider oral lozenges or chewing gum throughout the day (promotes saliva to neutralize refluxed acid).     *Avoid NSAIDs (ibuprofen, motrin, aleve, naproxen, etc) and aspirin as able.     Start taking omeprazole once a day at 40 mg dosing. This is best taken 30-60 minutes before breakfast on an empty stomach.

## 2024-01-22 NOTE — H&P
[facility] - Gastroenterology                                                                                                               Reason for consult: abdominal pain     Requesting physician or provider: Saniya Slaughter MD    Chief Complaint   Patient presents with    Abdominal Pain       HPI:   Diamond Nava is a 43 year old woman with history of migraines presenting for evaluation of abdominal pain.     She developed LLQ abdomina pain in 11/2023. She thought initially this was due to a cyst. She notes similar pain in 2019. She had a CTAP at that time notable for hiatal hernia. She notes that pain started in the middle of the night and woke her up from sleep. Pain radiated to the left lower back and lasted for about 1 week. She denies urinary sx. She was prescribed cipro by her PCP for possible diverticulitis with improvement in her pain. She also notes history of diarrhea occurring once a day. This resolved. She now  is moving her bowels 1-2 times a day. She notes that stools are soft and easy to pass. She denies constipation. She denies blood in her stool. She has history of heartburn and acid reflux. She experiences these sx daily. She takes a mexican powder to help with this. She does not take this every day as she feels like GERD sx are tolerable. She notes that she sleeps face down and will sometimes aspirate in her sleep. She feels her GERD sx are worse at night. She also endorses nausea that occurs daily at night. She reports solid food dysphagia that occurs a few times a week. She does not note any clear GERD triggers. She has lost 10lbs over the past few months unintentionally. She will take ibuprofen 2-3x/month, usually 400-600 mg at a time.     She notes father had a history of diverticulitis and required colon resection and colostomy bag placement.     Prior endoscopies:  None     Soc:  -no  smoking  -social EtOH use  -no thc, illiciits    Wt Readings from Last 6 Encounters:   01/22/24 163 lb (73.9 kg)   12/27/23 172 lb (78 kg)   06/02/22 160 lb (72.6 kg)   02/09/22 160 lb (72.6 kg)   06/09/21 163 lb (73.9 kg)   11/06/20 164 lb (74.4 kg)        History, Medications, Allergies, ROS:      Past Medical History:   Diagnosis Date    Hernia, umbilical     Migraines       Past Surgical History:   Procedure Laterality Date    ELECTROCARDIOGRAM, COMPLETE  12/07/2013    scanned to media tab      Family Hx:   Family History   Problem Relation Age of Onset    Stroke Daughter         childhood migraines    Stroke Son         AHDD, allergies    Hypertension Father     Lipids Father         hyperlipidemia    Stroke Father         cerebrovascular accident    Other (Other) Father         rosacea     Other (Autoimmune Encephalitis) Father     Diabetes Mother     Migraines Mother     Other (Other) Son 11        testicle surgery on left side       Social History:   Social History     Socioeconomic History    Marital status:    Tobacco Use    Smoking status: Never    Smokeless tobacco: Never   Vaping Use    Vaping Use: Never used   Substance and Sexual Activity    Alcohol use: Yes     Alcohol/week: 1.0 standard drink of alcohol     Types: 1 Glasses of wine per week     Comment: once monthly    Drug use: No    Sexual activity: Yes     Partners: Male     Birth control/protection: Vasectomy   Other Topics Concern    Caffeine Concern Yes     Comment: coffee, 1 cup daily    Exercise Yes     Comment: 3 times per week    Reaction to local anesthetic No   Social History Narrative    The patient does not use an assistive device..      The patient does live in a home with stairs.        Medications (Active prior to today's visit):  Current Outpatient Medications   Medication Sig Dispense Refill    multivitamin Oral Chew Tab Chew 1 tablet by mouth daily.      Omeprazole 40 MG Oral Capsule Delayed Release Take 1 capsule (40 mg  total) by mouth daily. Take 1 capsule by mouth daily before breakfast. 90 capsule 3    PEG 3350-KCl-Na Bicarb-NaCl (TRILYTE) 420 g Oral Recon Soln Take prep as directed by gastro office. May substitute with Trilyte/generic equivalent if needed. 4000 mL 0    Fremanezumab-vfrm (AJOVY) 225 MG/1.5ML Subcutaneous Solution Auto-injector Inject 225 mg into the skin every 30 (thirty) days. 1.5 mL 3    Eletriptan Hydrobromide 40 MG Oral Tab Take 1 tablet (40 mg total) by mouth as needed. MAY REPEAT ONCE AFTER 4 HOURS- ONLY 2 IN 24 HOUR PERIOD MAX. 8 tablet 5    ergocalciferol 1.25 MG (19745 UT) Oral Cap Take 1 capsule (50,000 Units total) by mouth once a week.      ibuprofen 200 MG Oral Tab Take 1 tablet (200 mg total) by mouth every 6 (six) hours as needed for Pain. OTC PRN      Vitamin D3, Cholecalciferol, 125 MCG (5000 UT) Oral Cap Take 1 capsule (5,000 Units total) by mouth daily.         Allergies:  Allergies   Allergen Reactions    Topiramate OTHER (SEE COMMENTS)     Slurred speech       ROS:   CONSTITUTIONAL:  negative for fevers, rigors  EYES:  negative for diplopia   RESPIRATORY:  negative for severe shortness of breath  CARDIOVASCULAR:  negative for crushing sub-sternal chest pain  GASTROINTESTINAL:  see HPI  GENITOURINARY:  negative for dysuria or gross hematuria  INTEGUMENT/BREAST:  SKIN:  negative for jaundice   ALLERGIC/IMMUNOLOGIC:  negative for hay fever  ENDOCRINE:  negative for cold intolerance and heat intolerance  MUSCULOSKELETAL:  negative for joint effusion/severe erythema  BEHAVIOR/PSYCH:  negative for psychotic behavior    PHYSICAL EXAM:   Blood pressure 96/67, pulse 73, height 5' (1.524 m), weight 163 lb (73.9 kg), last menstrual period 12/26/2023, not currently breastfeeding.    Gen: appears comfortable and in no acute distress  HEENT: sclera appear anicteric, mucus membranes appear moist  CV: regular rate, the extremities are warm and well-perfused   Lung: no increased work of breathing, no  conversational dyspnea   Abd: soft, non-tender exam in all quadrants without rigidity or guarding, non-distended, no masses palpated  Skin: no jaundice, no apparent rashes   Neuro: alert and interactive, no focal neuro deficits  Psych: cooperative, normal affect     Labs/Imaging:     Patient's pertinent labs and imaging were reviewed and discussed with patient today.      ASSESSMENT/PLAN:   Diamond Nava is a 43 year old woman with history of migraines presenting for evaluation of abdominal pain.     She presents today with abdominal pain that occurred in November 2023. The pain was located in her LLQ and accompanied by daily diarrhea. She had a CTAP in 12/2023 that showed hiatal hernia (present on CT in 2019) and diverticulosis without diverticulitis. She was empirically treated for diverticulitis by her PCP with ciprofloxacin with improvement in her sx. She has never had a colonoscopy.     She additionally notes GERD with acid reflux and heartburn that occur daily. She is not on a PPI or H2 blocker. She will sometimes take a mexican powder for GERD. She also endorses aspiration at night when sleeping on her stomach - unclear if she means reflux. She has not seen food on her pillow. She does have solid food dysphagia that occurs several times a week. As above, she has known hiatal hernia based on CT imaging. She has never undergone an EGD. We discussed GERD lifestyle modifications and starting a daily PPI.     We will plan for EGD with possible balloon dilatation given her history of GERD and dysphagia. We will plan for colonoscopy given this questionable history of diverticulitis as the source of her abdominal pain in November.     Recommendations:  1. Schedule upper endoscopy (EGD) with possible balloon dilatation and colonoscopy with MAC [Diagnosis: dysphagia, acid reflux, possible hx diverticulitis, abdominal pain].    2.  bowel prep from pharmacy (split dose trilyte). If your prescription is not  available and/or is cost-prohibitive, please contact the office as soon as possible to ensure you receive a bowel prep before your procedure.     3. Medication adjustments: Continue ALL medications as usual    4. Read all bowel prep instructions carefully.    5. AVOID seeds, nuts, popcorn, and raw fruits and vegetables (cooked is okay) for 2-3 days before procedure.    6. If you start any NEW medication after your visit today, please notify us. Certain medications will need to be held before the procedure or the procedure cannot be performed.     7. You will need a ride home from your procedure since you are receiving sedation. Please ensure you will have an available ride home or the procedure cannot be performed.           Lifestyle and dietary modifications were discussed today including but not limited to:     *Elevated head of bed at night given nocturnal symptoms     *Refrain from laying down after consuming a meal and do not eat meals 2-3 hours before bedtime.     *Avoid tight clothing or belts.      *Weight loss as able.      *Eliminate dietary triggers (examples: caffeine, chocolate, spicy foods, fried/fatty foods, peppermint and carbonated beverages).     *Avoid tobacco and alcohol as both can worsen acid reflux (reduces to pressure of the lower esophageal valve and smoking also reduces saliva production).     *Consider oral lozenges or chewing gum throughout the day (promotes saliva to neutralize refluxed acid).     *Avoid NSAIDs (ibuprofen, motrin, aleve, naproxen, etc) and aspirin as able.     Start taking omeprazole once a day at 40 mg dosing. This is best taken 30-60 minutes before breakfast on an empty stomach.     I have discussed the risks, benefits, and alternatives to upper endoscopy and colonoscopy with the patient/primary decision maker [who demonstrated understanding], including but not limited to the risks of bleeding, infection, pain, as well as the risks of anesthesia and perforation all  leading to prolonged hospitalization, surgical intervention. I also specifically mentioned the miss rate of colonoscopy of 5-10% in the best of all circumstances. The patient has agreed to sign an informed consent and elected to proceed with upper endoscopy and colonoscopy with possible intervention [i.e. polypectomy, control of bleeding, dilatation etc.] as indicated.    Orders This Visit:  No orders of the defined types were placed in this encounter.      Meds This Visit:  Requested Prescriptions     Signed Prescriptions Disp Refills    Omeprazole 40 MG Oral Capsule Delayed Release 90 capsule 3     Sig: Take 1 capsule (40 mg total) by mouth daily. Take 1 capsule by mouth daily before breakfast.    PEG 3350-KCl-Na Bicarb-NaCl (TRILYTE) 420 g Oral Recon Soln 4000 mL 0     Sig: Take prep as directed by gastro office. May substitute with Trilyte/generic equivalent if needed.       Imaging & Referrals:  None       Sheri Rivera MD  [facility] Gastroenterology  1/22/2024

## 2024-01-22 NOTE — TELEPHONE ENCOUNTER
Scheduled for:  Colonoscopy 14313 EGD 26297 with Possible balloon DIL.  Provider Name:  Dr Rivera  Date:  4/17/2024  Location:  [facility]  Sedation:  MAC  Time:  1015 am. (pt is aware that [facility] will call the day before to confirm arrival time)  Prep:  Split dose Trilyte  Meds/Allergies Reconciled?:  Physician Reviewed  Diagnosis with codes:    Dysphagia R10.19  Acid reflux K21.9  Possible Hx of Diverticulitis Z87.19  Abdominal pain R10.9  Was patient informed to call insurance with codes (Y/N):  Yes  Referral sent?:  Referral was sent at the time of electronic surgical scheduling.  EM or [facility] notified?:  I sent an electronic request to Endo Scheduling and received a confirmation today.  Medication Orders:  Pt is aware to NOT take iron pills, herbal meds and diet supplements for 7 days before exam. Also to NOT take any form of alcohol, recreational drugs and any forms of ED meds 24 hours before exam.   Misc Orders:  N/A   Further instructions given by staff:  I discussed the prep instructions with the patient which she verbally understood. I provided patient with prep instruction's sheet in office.      Patient was informed about the new cancellation policy for his/her procedure. Patient was also given a copy of the cancellation policy at the time of the appointment and verbalized understanding.

## 2024-03-06 RX ORDER — DICYCLOMINE HCL 20 MG
20 TABLET ORAL 3 TIMES DAILY PRN
Qty: 90 TABLET | Refills: 0 | Status: SHIPPED | OUTPATIENT
Start: 2024-03-06 | End: 2024-04-05

## 2024-04-02 ENCOUNTER — TELEPHONE (OUTPATIENT)
Facility: CLINIC | Age: 44
End: 2024-04-02

## 2024-04-02 RX ORDER — DICYCLOMINE HCL 20 MG
20 TABLET ORAL 3 TIMES DAILY PRN
Qty: 90 TABLET | Refills: 0 | Status: SHIPPED | OUTPATIENT
Start: 2024-04-02 | End: 2024-05-02

## 2024-04-17 PROCEDURE — 88305 TISSUE EXAM BY PATHOLOGIST: CPT | Performed by: INTERNAL MEDICINE

## 2024-04-17 PROCEDURE — 88312 SPECIAL STAINS GROUP 1: CPT | Performed by: INTERNAL MEDICINE

## 2024-04-19 ENCOUNTER — TELEPHONE (OUTPATIENT)
Facility: CLINIC | Age: 44
End: 2024-04-19

## 2024-04-19 RX ORDER — OMEPRAZOLE 40 MG/1
40 CAPSULE, DELAYED RELEASE ORAL 2 TIMES DAILY
Qty: 90 CAPSULE | Refills: 3 | Status: SHIPPED | OUTPATIENT
Start: 2024-04-19

## 2024-04-19 NOTE — TELEPHONE ENCOUNTER
Spoke to patient    Discussed the EGD results and plan of care.    Schedule patient for a 3 month follow up.    All questions answered, patient verbalized understanding    Dr. Wade    Is it ok I switched the omeprazole order to BID?    Your Appointments      Monday July 22, 2024 11:00 AM  Follow Up Visit with Sheri Rivera MD  [place] ([address] [address]  [phone]

## 2024-04-19 NOTE — TELEPHONE ENCOUNTER
----- Message from Sheri Rivera MD sent at 4/18/2024  4:58 PM CDT -----  GI staff: please recall for EGD in 3 years and schedule for follow-up with me in 3 months. Thanks!

## 2024-04-24 ENCOUNTER — PATIENT MESSAGE (OUTPATIENT)
Facility: CLINIC | Age: 44
End: 2024-04-24

## 2024-04-24 RX ORDER — OMEPRAZOLE 40 MG/1
40 CAPSULE, DELAYED RELEASE ORAL 2 TIMES DAILY
Qty: 90 CAPSULE | Refills: 3 | OUTPATIENT
Start: 2024-04-24

## 2024-04-24 NOTE — TELEPHONE ENCOUNTER
From: Diamond Nava  To: Sheri Rivera  Sent: 4/24/2024 3:40 PM CDT  Subject: Omeprazole BID prescription     Good afternoon,    The pharmacy is unable to fill my new prescription for Omeprazole until the dosage has been clarified by your office.     Diamond GUALLPA

## 2024-07-22 ENCOUNTER — OFFICE VISIT (OUTPATIENT)
Facility: CLINIC | Age: 44
End: 2024-07-22
Payer: COMMERCIAL

## 2024-07-22 VITALS
WEIGHT: 172 LBS | HEIGHT: 60 IN | HEART RATE: 81 BPM | DIASTOLIC BLOOD PRESSURE: 79 MMHG | SYSTOLIC BLOOD PRESSURE: 115 MMHG | BODY MASS INDEX: 33.77 KG/M2

## 2024-07-22 DIAGNOSIS — K22.70 BARRETT'S ESOPHAGUS WITHOUT DYSPLASIA: ICD-10-CM

## 2024-07-22 DIAGNOSIS — K21.9 GASTROESOPHAGEAL REFLUX DISEASE, UNSPECIFIED WHETHER ESOPHAGITIS PRESENT: ICD-10-CM

## 2024-07-22 DIAGNOSIS — R05.3 CHRONIC COUGH: Primary | ICD-10-CM

## 2024-07-22 PROCEDURE — 3078F DIAST BP <80 MM HG: CPT | Performed by: INTERNAL MEDICINE

## 2024-07-22 PROCEDURE — 3008F BODY MASS INDEX DOCD: CPT | Performed by: INTERNAL MEDICINE

## 2024-07-22 PROCEDURE — 99214 OFFICE O/P EST MOD 30 MIN: CPT | Performed by: INTERNAL MEDICINE

## 2024-07-22 PROCEDURE — 3074F SYST BP LT 130 MM HG: CPT | Performed by: INTERNAL MEDICINE

## 2024-07-22 RX ORDER — SUCRALFATE ORAL 1 G/10ML
1 SUSPENSION ORAL
Qty: 560 ML | Refills: 0 | Status: SHIPPED | OUTPATIENT
Start: 2024-07-22 | End: 2024-08-05

## 2024-07-22 NOTE — PROGRESS NOTES
[facility] - Gastroenterology                                                                                                      Clinic Follow-up Visit    Chief Complaint   Patient presents with    Follow - Up    Gastro-esophageal Reflux         Subjective/HPI:   Initial visit 01/2024:   Diamond Nava is a 43 year old woman with history of migraines presenting for evaluation of abdominal pain.      She presents today with abdominal pain that occurred in November 2023. The pain was located in her LLQ and accompanied by daily diarrhea. She had a CTAP in 12/2023 that showed hiatal hernia (present on CT in 2019) and diverticulosis without diverticulitis. She was empirically treated for diverticulitis by her PCP with ciprofloxacin with improvement in her sx. She has never had a colonoscopy.      She additionally notes GERD with acid reflux and heartburn that occur daily. She is not on a PPI or H2 blocker. She will sometimes take a mexican powder for GERD. She also endorses aspiration at night when sleeping on her stomach - unclear if she means reflux. She has not seen food on her pillow. She does have solid food dysphagia that occurs several times a week. As above, she has known hiatal hernia based on CT imaging. She has never undergone an EGD. We discussed GERD lifestyle modifications and starting a daily PPI.      We will plan for EGD with possible balloon dilatation given her history of GERD and dysphagia. We will plan for colonoscopy given this questionable history of diverticulitis as the source of her abdominal pain in November.     EGD/colonoscopy 04/2024:   EGD findings:       1. Esophagus: The squamocolumnar junction was noted at 34 cm and appeared irregular with 2 < 1 cm tongues of salmon-colored mucosa extending from the z-line. The diaphragmatic pinch was noted noted at 34 cm from the incisors. The esophageal  mucosa otherwise appeared normal with no strictures. The adult endoscope passed easily through the esophagus and into the stomach. Biopsies obtained from the proximal and distal esophagus.   2. Stomach: The stomach distended normally. Normal rugal folds were seen. The pylorus was patent. Retroflexion revealed a normal fundus. The gastric mucosa appeared normal. Biopsies were taken with a cold forceps from the antrum, incisura and body for histology.  3. Duodenum: The duodenal mucosa appeared normal in the duodenal bulb and second portion of the duodenum.      EGD Impression:  -Esophagus: Irregular z-line with 2 less than 1 cm tongues of salmon-colored mucosa. Biopsies obtained from the distal and proximal esophagus. No findings to explain dysphagia.   -Stomach: Normal. Biopsied.   -Duodenum: Normal.      Colonoscopy findings:     1. No polyps noted.  2. Diverticulosis: mild, sigmoid colon. No evidence of diverticulitis.   3. Ileocecal valve appeared normal. The examined portion of the terminal ileum appeared normal.   4. The colonic mucosa throughout the colon showed normal vascular pattern, without evidence of angioectasias or inflammation.   5. A retroflexed view of the rectum revealed small internal hemorrhoids.  6. SAHIL: normal rectal tone, no masses palpated.      Colonoscopy Impression:  Mild sigmoid diverticulosis without diverticulitis.   Small internal hemorrhoids.   Colon was otherwise normal with glistening mucosa and intact vascular pattern throughout.     Recommend:  Await pathology.   Repeat colonoscopy in 10 years for screening. If new signs or symptoms develop, colonoscopy may need to be repeated sooner.   High fiber diet.  Monitor for blood in the stool. If having more than just tinge of blood, call office or go to the ER.    Final Diagnosis:      A. Gastric biopsy:   Mild chronic inactive gastritis.  Diff-Quik stain (appropriate control reactivity) shows no definitive evidence of H. pylori-like  microorganisms.  No evidence of dysplasia or carcinoma identified.     B. Distal esophagus; biopsy:  Fragments of squamocolumnar mucosa with mild chronic inflammation, features of reflux and focal complete intestinal metaplasia (see comment).  No evidence of dysplasia or carcinoma identified.     C. Proximal esophagus; biopsy:   Fragments of squamous mucosa with reflux esophagitis.  No evidence of eosinophilic esophagitis, dysplasia or carcinoma identified.     Comment:   Differential consideration for the finding of intestinal metaplasia associated with gastric type mucosa in this distal esophageal biopsy (specimen B) may include Ndiaye’s esophagus vs intestinal metaplasia found in association with gastritis of the gastric cardia. Clinical and endoscopic correlation is recommended to further characterize this histologic finding.     Recommendations:   1. Increase your omeprazole to twice a day dosing. Take this 30-60 minutes before breakfast and 30-60 minutes before dinner.  2. Avoid NSAID medications (motrin, ibuprofen, aleve, advil, naproxen, midol, naprosyn, excedrin).  3. Try to incorporate the following GERD lifestyle modifications:     *Elevated head of bed at night     *Refrain from laying down after consuming a meal and do not eat meals 2-3 hours before bedtime.     *Avoid tight clothing or belts.     *Weight loss as able.     *Eliminate dietary triggers (examples: caffeine, chocolate, spicy foods, fried/fatty foods, peppermint and carbonated beverages).     *Avoid tobacco and alcohol as both can worsen acid reflux (reduces to pressure of the lower esophageal valve and smoking also reduces saliva production).     *Consider oral lozenges or chewing gum throughout the day (promotes saliva to neutralize refluxed acid).   4. I will have my staff schedule you for follow-up with me in 3 months. We will try to decrease the omeprazole at that time to once a day dosing.  5. We will have you repeat an EGD (or upper  endoscopy) in 3 years to reassess the intestinal metaplasia.    Today's visit:   She had complete relief of symptoms when she went to twice a day dosing of her PPI until the end of June. Now, she notes 1 episode a week where she feels a tickle in her throat she believes from reflux and then will have coughing for an hour.     Wt Readings from Last 6 Encounters:   07/22/24 172 lb (78 kg)   04/09/24 168 lb (76.2 kg)   01/22/24 163 lb (73.9 kg)   12/27/23 172 lb (78 kg)   06/02/22 160 lb (72.6 kg)   02/09/22 160 lb (72.6 kg)        History, Medications, Allergies, ROS:      Past Medical History:    Hernia, umbilical    Migraines      Past Surgical History:   Procedure Laterality Date    Electrocardiogram, complete  12/07/2013    scanned to media tab    Hernia surgery      15 yrs ago      Family History   Problem Relation Age of Onset    Stroke Daughter         childhood migraines    Stroke Son         AHDD, allergies    Hypertension Father     Lipids Father         hyperlipidemia    Stroke Father         cerebrovascular accident    Other (Other) Father         rosacea     Other (Autoimmune Encephalitis) Father     Diabetes Mother     Migraines Mother     Other (Other) Son 11        testicle surgery on left side       Social History:   Social History     Socioeconomic History    Marital status:    Tobacco Use    Smoking status: Never    Smokeless tobacco: Never   Vaping Use    Vaping status: Never Used   Substance and Sexual Activity    Alcohol use: Yes     Alcohol/week: 1.0 standard drink of alcohol     Types: 1 Glasses of wine per week     Comment: once monthly    Drug use: No    Sexual activity: Yes     Partners: Male     Birth control/protection: Vasectomy   Other Topics Concern    Caffeine Concern Yes     Comment: coffee, 1 cup daily    Exercise Yes     Comment: 3 times per week    Reaction to local anesthetic No   Social History Narrative    The patient does not use an assistive device..      The patient  does live in a home with stairs.     Social Determinants of Health     Physical Activity: High Risk (10/4/2022)    Received from [facility]    Physical Activity     How often do you engage in moderate physical activity for 30 minutes or more?: 1 to 3 days a week     How often do you engage in vigorous physical activity for 20 minutes or more?: 1 to 3 days a week     How many hours per day do you spend sitting?: 6 to 8 hours    Received from [facility]    Stress        Medications (Active prior to today's visit):  Current Outpatient Medications   Medication Sig Dispense Refill    sucralfate 1 GM/10ML Oral Suspension Take 10 mL (1 g total) by mouth 4 (four) times daily before meals and nightly for 14 days. Take 4 times daily on an empty stomach 1 hour before meals and at bedtime 560 mL 0    Omeprazole 40 MG Oral Capsule Delayed Release Take 1 capsule (40 mg total) by mouth in the morning and 1 capsule (40 mg total) before bedtime. Take 1 capsule by mouth daily before breakfast.. 90 capsule 3    multivitamin Oral Chew Tab Chew 1 tablet by mouth daily.      Fremanezumab-vfrm (AJOVY) 225 MG/1.5ML Subcutaneous Solution Auto-injector Inject 225 mg into the skin every 30 (thirty) days. 1.5 mL 3    Eletriptan Hydrobromide 40 MG Oral Tab Take 1 tablet (40 mg total) by mouth as needed. MAY REPEAT ONCE AFTER 4 HOURS- ONLY 2 IN 24 HOUR PERIOD MAX. 8 tablet 5    ergocalciferol 1.25 MG (80938 UT) Oral Cap Take 1 capsule (50,000 Units total) by mouth once a week.      Vitamin D3, Cholecalciferol, 125 MCG (5000 UT) Oral Cap Take 1 capsule (5,000 Units total) by mouth daily.         Allergies:  Allergies   Allergen Reactions    Topiramate OTHER (SEE COMMENTS)     Slurred speech       ROS:   CONSTITUTIONAL:  negative for fevers, chills  EYES:  negative for change in vision  RESPIRATORY:  negative for  shortness of breath  CARDIOVASCULAR:  negative for  chest pain  GASTROINTESTINAL:  see  HPI  GENITOURINARY:  negative for dysuria  INTEGUMENT/BREAST:  SKIN:  negative for  rash  ALLERGIC/IMMUNOLOGIC:  negative for hay fever  ENDOCRINE:  negative for cold intolerance and heat intolerance  MUSCULOSKELETAL:  negative for  joint stiffness and joint swelling  BEHAVIOR/PSYCH:  negative for depressed mood    PHYSICAL EXAM:   Blood pressure 115/79, pulse 81, height 5' (1.524 m), weight 172 lb (78 kg), last menstrual period 03/20/2024, not currently breastfeeding.    Gen- Patient appears comfortable and in no acute discomfort  HEENT: the sclera appears anicteric, oropharynx clear, mucus membranes appear moist  CV- regular rate and rhythm, the extremities are warm and well perfused   Lung- Moves air well; No labored breathing  Abdomen- soft, non-tender exam in all quadrants without rigidity or guarding, non-distended, no masses are palpated  Skin- No jaundice  Ext: no edema is evident.   Neuro- Alert and oriented x4, and patient is having movement of all 4 extremities   Psych - appropriate, non-agitated    Labs/Imaging:     Patient's labs and imaging were reviewed and discussed with patient today.     ASSESSMENT/PLAN:   Diamond Nava is a 43 year old woman with history of migraines presenting for follow-up.     She initially presented for evaluation of abdominal pain that occurred in November 2023. The pain was located in her LLQ and accompanied by daily diarrhea. She had a CTAP in 12/2023 that showed hiatal hernia (present on CT in 2019) and diverticulosis without diverticulitis. She was empirically treated for diverticulitis by her PCP with ciprofloxacin with improvement in her sx. She additionally noted GERD with acid reflux and heartburn that occurred daily. This was associated with solid food dysphagia that occurred several times a week.    She underwent EGD and colonoscopy in 04/2024. EGD with irregular z-line with 2 less than 1 cm tongues of salmon-colored mucosa. Esophageal biopsies (distal and  proximal) with reflux esophagitis and focal complete intestinal metaplasia. No eosinophilia. Gastric biopsies benign. Colonoscopy with mild sigmoid diverticulosis and small internal hemorrhoids.     She increased her omeprazole to BID dosing with initial improvement/resolution of sx. Now, having tickle in her throat and coughing for 1 hour about 1x weekly that she attributes to reflux Recommended she continue BID PPI dosing and trial sucralfate for 2 weeks. Also recommended ENT evaluation to exclude other sources of cough. If ENT evaluation unrevealing and she continues to have cough on omeprazole, could consider PPI change and/or referral for pH testing.     She will need repeat EGD in 3 years (2027) for BE surveillance.   She will need repeat colonoscopy in 10 years (2034) for CRC screening.     Recommendations:  Schedule an appointment with the ENT doctor.    Start taking sucralfate 4 times a day.     Lifestyle and dietary modifications were discussed today including but not limited to:     *Elevated head of bed at night      *Refrain from laying down after consuming a meal and do not eat meals 2-3 hours before bedtime.     *Avoid tight clothing or belts.      *Weight loss as able.      *Eliminate dietary triggers (examples: caffeine, chocolate, spicy foods, fried/fatty foods, peppermint and carbonated beverages).     *Avoid tobacco and alcohol as both can worsen acid reflux (reduces to pressure of the lower esophageal valve and smoking also reduces saliva production).     *Consider oral lozenges or chewing gum throughout the day (promotes saliva to neutralize refluxed acid).     *Avoid NSAIDs (ibuprofen, motrin, aleve, naproxen, etc) and aspirin as able.     Orders This Visit:  No orders of the defined types were placed in this encounter.      Meds This Visit:  Requested Prescriptions     Signed Prescriptions Disp Refills    sucralfate 1 GM/10ML Oral Suspension 560 mL 0     Sig: Take 10 mL (1 g total) by mouth 4  (four) times daily before meals and nightly for 14 days. Take 4 times daily on an empty stomach 1 hour before meals and at bedtime       Imaging & Referrals:  ENT - INTERNAL     Sheri Rivera MD  [facility]  7/22/2024

## 2024-07-22 NOTE — PATIENT INSTRUCTIONS
Schedule an appointment with the ENT doctor.    Start taking sucralfate 4 times a day.     Lifestyle and dietary modifications were discussed today including but not limited to:     *Elevated head of bed at night      *Refrain from laying down after consuming a meal and do not eat meals 2-3 hours before bedtime.     *Avoid tight clothing or belts.      *Weight loss as able.      *Eliminate dietary triggers (examples: caffeine, chocolate, spicy foods, fried/fatty foods, peppermint and carbonated beverages).     *Avoid tobacco and alcohol as both can worsen acid reflux (reduces to pressure of the lower esophageal valve and smoking also reduces saliva production).     *Consider oral lozenges or chewing gum throughout the day (promotes saliva to neutralize refluxed acid).     *Avoid NSAIDs (ibuprofen, motrin, aleve, naproxen, etc) and aspirin as able.

## 2024-09-10 ENCOUNTER — PATIENT MESSAGE (OUTPATIENT)
Dept: NEUROLOGY | Facility: CLINIC | Age: 44
End: 2024-09-10

## 2024-09-16 NOTE — TELEPHONE ENCOUNTER
From: Diamond Nava  To: Darlene Chamberlain  Sent: 9/10/2024 7:39 AM CDT  Subject: Rx Pre-authorization     Good morning Dr. Chamberlain,    [facility] in [place] ([place], [place] is unable to refill my Ajovy Rx without a completed pre-authorization from you. They state this is a new requirement from my insurance (I never encountered this issue before) and asked me to reach out to you. Please complete the required form at your earliest convenience.    Kind regards,  Diamond GUALLPA

## 2024-09-18 ENCOUNTER — HOSPITAL ENCOUNTER (EMERGENCY)
Facility: HOSPITAL | Age: 44
Discharge: LEFT BEFORE DISCHARGE | End: 2024-09-18
Payer: COMMERCIAL

## 2024-09-18 VITALS
OXYGEN SATURATION: 99 % | BODY MASS INDEX: 33.38 KG/M2 | HEIGHT: 60 IN | WEIGHT: 170 LBS | TEMPERATURE: 98 F | HEART RATE: 92 BPM | RESPIRATION RATE: 18 BRPM | DIASTOLIC BLOOD PRESSURE: 79 MMHG | SYSTOLIC BLOOD PRESSURE: 134 MMHG

## 2024-09-18 RX ORDER — METFORMIN HYDROCHLORIDE EXTENDED-RELEASE TABLETS 500 MG/1
500 TABLET, FILM COATED, EXTENDED RELEASE ORAL
COMMUNITY

## 2024-09-19 NOTE — ED INITIAL ASSESSMENT (HPI)
Pt arrives ambulatory to ED for c/o migraine that started 1530 today. Pt states she took her normal migraine meds at home without relief. And states pain is worsening. +NV. +hot flashes. Aox4, speaking in full sentences.

## 2025-05-07 DIAGNOSIS — G43.009 MIGRAINE WITHOUT AURA AND WITHOUT STATUS MIGRAINOSUS, NOT INTRACTABLE: ICD-10-CM

## 2025-05-08 RX ORDER — FREMANEZUMAB-VFRM 225 MG/1.5ML
225 INJECTION SUBCUTANEOUS
Qty: 1 EACH | Refills: 3 | Status: SHIPPED | OUTPATIENT
Start: 2025-05-08 | End: 2026-05-08

## 2025-05-08 NOTE — TELEPHONE ENCOUNTER
Requested Prescriptions     Pending Prescriptions Disp Refills    AJOVY 225 MG/1.5ML Subcutaneous Solution Auto-injector [Pharmacy Med Name: AJOVY 225 MG/1.5 ML AUTOINJECT]  3     Sig: INJECT 225 MG INTO THE SKIN EVERY 30 (THIRTY) DAYS.       Last OV: 2023   Next OV:     [place]/; UNABLE TO VERIFY      Last office visit plan;    ASSESSMENT:  The patient is a 43 year old woman with past medical history of migraine, adjustment disorder with mixed anxiety and depressed mood, vitamin D deficiency, GERD, umbilical hernia who presents for follow up regarding chronic migraine.  Her neurological examination is essentially normal.       The patient's history is consistent with migraine without aura; more specifically there is pounding/throbbing pain, unilateral, associated with phonophobia, nausea/vomiting.       The patient's migraines are chronic (15 headache days per month, 8 of which are migraines).  This signifies need for preventative therapy.       Patient's chronic migraine has transformed back to episodic migraine without aura with Ajovy   -Preventative:  Ajovy   Future considerations: Emgality, resume Aimovig, Botox   -Abortive: Eletriptan 40 mg, Compazine + Benadryl for nausea/antimigraine, discussed risk of EPS                 Future considerations: Almotriptan, Nurtec, Ubrelvy                 Limit <2 times per week to avoid developing medication overuse headaches   -Neuroimaging: No need for now, no red flag symptoms   -Follow up: 1 year   -Lifestyle information provided    -Patient will let my office know if she becomes pregnant or plan to become pregnant so we can adjust/stop medication safely due to risk to fetus/fetal malformations from medications prescribed         Discussed indication, administration, dose, and side effects with patient of any medications personally prescribed. Patient was advised to let my office know if they have any questions or concerns.         Today, I personally spent 15 minutes in  this case, including chart review, time spent with patient doing face to face evaluation w/ interview and exam and patient education, counseling, and time was spent in patient education, counseling, and coordination of care as described above.   Issues discussed: Diagnosis and implications on future health, benefits and side effects of present and future medications, test results as well as further testing and medications required.     This note was prepared using Dragon Medical voice recognition dictation software and as a result, errors may occur. When identified, these errors have been corrected. While every attempt is made to correct errors during dictation, discrepancies may still exist     GERMAN Chamberlain DO   Staff Physician, Neurology   12/27/2023  10:58 AM                    Instructions      Return in about 1 year (around 12/27/2024).

## 2025-06-23 RX ORDER — OMEPRAZOLE 40 MG/1
40 CAPSULE, DELAYED RELEASE ORAL 2 TIMES DAILY
Qty: 60 CAPSULE | Refills: 5 | Status: SHIPPED | OUTPATIENT
Start: 2025-06-23

## 2025-06-23 NOTE — TELEPHONE ENCOUNTER
Requested Prescriptions     Pending Prescriptions Disp Refills    OMEPRAZOLE 40 MG Oral Capsule Delayed Release [Pharmacy Med Name: OMEPRAZOLE DR 40 MG CAPSULE] 60 capsule 5     Sig: TAKE 1 CAPSULE (40 MG TOTAL) BY MOUTH IN THE MORNING AND 1 CAPSULE (40 MG TOTAL) BEFORE BEDTIME     Last seen: 7/22/24  Suggested follow up:  Last refill: 4/19/24    Refill pended, please review/sign if agreeable.

## 2025-07-11 ENCOUNTER — NURSE ONLY (OUTPATIENT)
Dept: INTERNAL MEDICINE CLINIC | Facility: HOSPITAL | Age: 45
End: 2025-07-11
Attending: PREVENTIVE MEDICINE

## 2025-07-11 DIAGNOSIS — Z00.00 WELLNESS EXAMINATION: Primary | ICD-10-CM

## 2025-07-11 PROCEDURE — 86480 TB TEST CELL IMMUN MEASURE: CPT

## 2025-07-14 LAB
M TB IFN-G CD4+ T-CELLS BLD-ACNC: 0.04 IU/ML
M TB TUBERC IFN-G BLD QL: NEGATIVE
M TB TUBERC IGNF/MITOGEN IGNF CONTROL: >10 IU/ML
QFT TB1 AG MINUS NIL: -0.01 IU/ML
QFT TB2 AG MINUS NIL: 0 IU/ML

## (undated) DIAGNOSIS — G43.901 STATUS MIGRAINOSUS: Primary | ICD-10-CM

## (undated) NOTE — LETTER
11/06/20    You will need to return to clinic in 48-72 hours to have results of TB test read. Please return to clinic on 11/09/2020 before 4:30 to have your TB test read. If you do not return during this time your test will need to be repeated.

## (undated) NOTE — LETTER
5/31/2018          To Whom It May Concern:    Lin Cox is currently under my medical care and the rubella levels was retested on 5/3/18 after booster dose.  The Rubella IgG levels are 187.9 and she as adequate immunity now as confirmed by the labs

## (undated) NOTE — MR AVS SNAPSHOT
[address] [facility] [address]  [phone]               Thank you for choosing us for your health care visit with Adan Cody NP. We are glad to serve you and happy to provide you with this summary of your visit. · Over-the-counter decongestants may be used unless a similar medicine was prescribed. Nasal sprays work the fastest. Use one that contains phenylephrine or oxymetazoline. First blow the nose gently. Then use the spray.  Do not use these medicines more ofte 35909. All rights reserved. This information is not intended as a substitute for professional medical care. Always follow your healthcare professional's instructions.         Understanding Your Sinuses  Your sinuses are air-filled spaces between the bones i Take 1 tablet by mouth 2 (two) times daily. Commonly known as:  AUGMENTIN           Desloratadine 5 MG Tabs   Take 1 tablet by mouth daily.    Commonly known as:  CLARINEX           predniSONE 20 MG Tabs   Take 1 tablet (20 mg total) by mouth 2 (two) time Water is best for hydration Fast food. Eat at home when possible     Tips for increasing your physical activity – Adults who are physically active are less likely to develop some chronic diseases than adults who are inactive.      HOW TO GET STARTED: HOW

## (undated) NOTE — ED AVS SNAPSHOT
Maikel Robin   MRN: [id number]    Department:  [facility] Emergency Department   Date of Visit:  4/27/2019           Disclosure     Insurance plans vary and the physician(s) referred by the ER may not be covered by your plan.  Please conta CARE PHYSICIAN AT ONCE OR RETURN IMMEDIATELY TO THE EMERGENCY DEPARTMENT. If you have been prescribed any medication(s), please fill your prescription right away and begin taking the medication(s) as directed.   If you believe that any of the medications

## (undated) NOTE — LETTER
11/09/20        Allan Das  12/12/1980      This document is to verify Allan Das had a TB skin test preformed and the results were: negative 0.0 mm.     Date given:11/06/20  Date read: 11/09/20        Please call the number listed above if

## (undated) NOTE — MR AVS SNAPSHOT
[address] [place] [id number] [phone]               Thank you for choosing us for your health care visit with Kiki Orta NP. We are glad to serve you and happy to provide you with this summary of your visit. · Wear dentures  · Are getting chemotherapy  · Are getting radiation therapy  · Have diabetes  · Have a transplanted organ  · Use corticosteroids  · Have a weakened immune system, such as from AIDS  · Are an older adult  Symptoms of thrush  Some people wit failure and even death. Bloodstream infection may need to be treated with high doses of antifungal medicine through an IV. Systemic infection is much more likely in people who are very ill.  It is also more common in those who have serious problems with th swallow 5 ml by mouth 4 times per day for 2 weeks.    Commonly known as:  MYCOSTATIN                Where to Get Your Medications      These medications were sent to [facility] [address] [facility] [phone], [address] [place] [address]

## (undated) NOTE — MR AVS SNAPSHOT
[facility]  [address] [place] [id number] [phone]               Thank you for choosing us for your health care visit with ARNOL Lamas. We are glad to serve you and happy to provide you with this summary of your visit. Visit [employer]  You can access your MyChart to more actively manage your health care and view more details from this visit by going to [email or web address].   If you've recently had a stay at the Hospital you can access your discharge instructions i